# Patient Record
Sex: MALE | Race: WHITE | NOT HISPANIC OR LATINO | Employment: OTHER | URBAN - NONMETROPOLITAN AREA
[De-identification: names, ages, dates, MRNs, and addresses within clinical notes are randomized per-mention and may not be internally consistent; named-entity substitution may affect disease eponyms.]

---

## 2017-04-05 RX ORDER — LISINOPRIL AND HYDROCHLOROTHIAZIDE 20; 12.5 MG/1; MG/1
0.5 TABLET ORAL DAILY
Qty: 90 TABLET | Refills: 2 | Status: SHIPPED | OUTPATIENT
Start: 2017-04-05 | End: 2018-04-17 | Stop reason: SDUPTHER

## 2017-04-05 RX ORDER — ATORVASTATIN CALCIUM 40 MG/1
40 TABLET, FILM COATED ORAL DAILY
Qty: 90 TABLET | Refills: 2 | Status: SHIPPED | OUTPATIENT
Start: 2017-04-05 | End: 2017-04-27 | Stop reason: SDUPTHER

## 2017-04-05 RX ORDER — ISOSORBIDE MONONITRATE 30 MG/1
30 TABLET, EXTENDED RELEASE ORAL DAILY
Qty: 90 TABLET | Refills: 2 | Status: SHIPPED | OUTPATIENT
Start: 2017-04-05 | End: 2017-08-24 | Stop reason: SDUPTHER

## 2017-04-05 RX ORDER — CLOPIDOGREL BISULFATE 75 MG/1
75 TABLET ORAL DAILY
Qty: 90 TABLET | Refills: 2 | Status: SHIPPED | OUTPATIENT
Start: 2017-04-05 | End: 2017-07-26 | Stop reason: SDUPTHER

## 2017-04-11 ENCOUNTER — OFFICE VISIT (OUTPATIENT)
Dept: CARDIOLOGY | Facility: CLINIC | Age: 67
End: 2017-04-11

## 2017-04-11 VITALS
WEIGHT: 207 LBS | DIASTOLIC BLOOD PRESSURE: 84 MMHG | HEIGHT: 69 IN | SYSTOLIC BLOOD PRESSURE: 150 MMHG | HEART RATE: 52 BPM | BODY MASS INDEX: 30.66 KG/M2

## 2017-04-11 DIAGNOSIS — R06.02 SOB (SHORTNESS OF BREATH): ICD-10-CM

## 2017-04-11 DIAGNOSIS — I25.10 ATHEROSCLEROSIS OF NATIVE CORONARY ARTERY OF NATIVE HEART WITHOUT ANGINA PECTORIS: Primary | ICD-10-CM

## 2017-04-11 DIAGNOSIS — R00.1 BRADYCARDIA: ICD-10-CM

## 2017-04-11 PROCEDURE — 99213 OFFICE O/P EST LOW 20 MIN: CPT | Performed by: INTERNAL MEDICINE

## 2017-04-11 RX ORDER — RANOLAZINE 500 MG/1
500 TABLET, EXTENDED RELEASE ORAL 2 TIMES DAILY
COMMUNITY
End: 2017-10-17 | Stop reason: SDUPTHER

## 2017-04-11 NOTE — PROGRESS NOTES
Storm Tellez  66 y.o. male    04/11/2017  1. Atherosclerosis of native coronary artery of native heart without angina pectoris    2. Bradycardia    3. SOB (shortness of breath)        History of Present Illness    Mrs. Tellez is here for follow-up of his above stated problems.  He denied any chest pain, shortness of breath or dizziness and underwent cardiac catheterization in December 2016 which showed that he had patent LIMA to LAD, YOUNG to obtuse marginal and SVG to distal right coronary artery.  Small vessel disease was noted in the diagonal territories.  He was placed on Ranexa which he has not taken a regular basis.  He has been physically quite active without any restrictions.  He will be seeing Dr. Ansari next month and will have lab work done.      SUBJECTIVE    No Known Allergies      Past Medical History:   Diagnosis Date   • Bradycardia    • Cancer     skin   • Coronary atherosclerosis of native coronary artery    • Dizziness    • Hypertension    • Presence of aortocoronary bypass graft    • Sleep apnea    • SOB (shortness of breath)          Past Surgical History:   Procedure Laterality Date   • CARDIAC CATHETERIZATION     • CORONARY ARTERY BYPASS GRAFT     • HERNIA REPAIR     • SHOULDER SURGERY           Family History   Problem Relation Age of Onset   • Heart disease Mother    • Heart disease Father          Social History     Social History   • Marital status:      Spouse name: N/A   • Number of children: N/A   • Years of education: N/A     Occupational History   • Not on file.     Social History Main Topics   • Smoking status: Former Smoker   • Smokeless tobacco: Never Used   • Alcohol use No   • Drug use: No   • Sexual activity: Defer     Other Topics Concern   • Not on file     Social History Narrative         Current Outpatient Prescriptions   Medication Sig Dispense Refill   • aspirin  MG tablet Take 325 mg by mouth Every 6 (Six) Hours As Needed.     • atorvastatin (LIPITOR)  "40 MG tablet Take 1 tablet by mouth Daily. 90 tablet 2   • clopidogrel (PLAVIX) 75 MG tablet Take 1 tablet by mouth Daily. 90 tablet 2   • folic acid (FOLVITE) 1 MG tablet Take 1 mg by mouth Daily.     • isosorbide mononitrate (IMDUR) 30 MG 24 hr tablet Take 1 tablet by mouth Daily. 90 tablet 2   • lisinopril-hydrochlorothiazide (PRINZIDE,ZESTORETIC) 20-12.5 MG per tablet Take 0.5 tablets by mouth Daily. 90 tablet 2   • nitroglycerin (NITROSTAT) 0.4 MG SL tablet Place 0.4 mg under the tongue Every 5 (Five) Minutes As Needed for chest pain. Take no more than 3 doses in 15 minutes.     • ranolazine (RANEXA) 500 MG 12 hr tablet Take 500 mg by mouth 2 (Two) Times a Day.       No current facility-administered medications for this visit.          OBJECTIVE    /84  Pulse 52  Ht 69\" (175.3 cm)  Wt 207 lb (93.9 kg)  BMI 30.57 kg/m2        Review of Systems     Constitutional:  Denies recent weight loss, weight gain, fever or chills, no change in exercise tolerance     HENT:  Denies any hearing loss, epistaxis, hoarseness, or difficulty speaking.     Eyes: Wears eyeglasses or contact lenses     Respiratory:  Denies dyspnea with exertion,no cough, wheezing, or hemoptysis.     Cardiovascular: Negative for palpations, chest pain, orthopnea, PND, peripheral edema, syncope, or claudication.     Gastrointestinal:  Denies change in bowel habits, dyspepsia, ulcer disease, hematochezia, or melena.     Endocrine: Negative for cold intolerance, heat intolerance, polydipsia, polyphagia and polyuria. Denies any history of weight change, heat/cold intolerance, polydipsia, polyuria     Genitourinary: Negative.      Musculoskeletal: Denies any history of arthritic symptoms or back problems     Skin:  Denies any change in hair or nails, rashes, or skin lesions.     Allergic/Immunologic: Negative.  Negative for environmental allergies, food allergies and immunocompromised state.     Neurological:  Denies any history of recurrent " headaches, strokes, TIA, or seizure disorder.     Hematological: Denies any food allergies, seasonal allergies, bleeding disorders, or lymphadenopathy.     Psychiatric/Behavioral: Denies any history of depression, substance abuse, or change in cognitive function.         Physical Exam     Constitutional: Cooperative, alert and oriented, well-developed, well-nourished, in no acute distress.     HENT:   Head: Normocephalic, normal hair patterns, no masses or tenderness.  Ears, Nose, and Throat: No gross abnormalities. No pallor or cyanosis. Dentition good.   Eyes: EOMS intact, PERRL, conjunctivae and lids unremarkable. Fundoscopic exam and visual fields not performed.   Neck: No palpable masses or adenopathy, no thyromegaly, no JVD, carotid pulses are full and equal bilaterally and without  Bruits.     Cardiovascular: Regular rhythm, S1 and S2 normal, no S3 or S4. Apical impulse not displaced. No murmurs, gallops, or rubs detected.     Pulmonary/Chest: Chest: normal symmetry, no tenderness to palpation, normal respiratory excursion, no intercostal retraction, no use of accessory muscles.            Pulmonary: Normal breath sounds. No rales or ronchi.    Abdominal: Abdomen soft, bowel sounds normoactive, no masses, no hepatosplenomegaly, non-tender, no bruits.     Musculoskeletal: No deformities, clubbing, cyanosis, erythema, or edema observed. There are no spinal abnormalities noted. Normal muscle strength and tone. Pulses full and equal in all extremities, no bruits auscultated.     Neurological: No gross motor or sensory deficits noted, affect appropriate, oriented to time, person, place.     Skin: Warm and dry to the touch, no apparent skin lesions or masses noted.     Psychiatric: He has a normal mood and affect. His behavior is normal. Judgment and thought content normal.         Procedures      Lab Results   Component Value Date    WBC 6.1 12/15/2016    HGB 14.8 12/15/2016    HCT 42.8 12/15/2016    MCV 91.3  12/15/2016     12/15/2016     Lab Results   Component Value Date    GLUCOSE 101 (H) 12/15/2016    BUN 18 12/15/2016    CREATININE 0.9 12/15/2016    CO2 26 12/15/2016    CALCIUM 9.0 12/15/2016     No results found for: CHOL  No results found for: TRIG  No results found for: HDL  No results found for: LDLCALC  No results found for: LDL  No results found for: HDLLDLRATIO  No components found for: CHOLHDL  No results found for: HGBA1C  No results found for: TSH, T0UYNML, J4PNHCW, THYROIDAB        ASSESSMENT AND PLAN    Mrs. Tellez is clinically stable with no evidence of angina, or congestive heart failure.  He is tolerating sinus bradycardia quite well with no hemodynamic consequence.  His present antiplatelet therapy with aspirin and Plavix, antianginal therapy with isosorbide mononitrate and Ranexa and antihypertensive therapy with lisinopril HCTZ and statin therapy with Lipitor have been continued.  Diagnoses and all orders for this visit:    Atherosclerosis of native coronary artery of native heart without angina pectoris    Bradycardia    SOB (shortness of breath)        Pradeep Francis MD  4/11/2017  11:55 AM

## 2017-04-27 RX ORDER — ATORVASTATIN CALCIUM 40 MG/1
40 TABLET, FILM COATED ORAL DAILY
Qty: 90 TABLET | Refills: 4 | Status: SHIPPED | OUTPATIENT
Start: 2017-04-27 | End: 2018-04-17 | Stop reason: SDUPTHER

## 2017-07-26 RX ORDER — CLOPIDOGREL BISULFATE 75 MG/1
75 TABLET ORAL DAILY
Qty: 90 TABLET | Refills: 2 | Status: SHIPPED | OUTPATIENT
Start: 2017-07-26 | End: 2018-04-17 | Stop reason: SDUPTHER

## 2017-08-22 ENCOUNTER — APPOINTMENT (OUTPATIENT)
Dept: GENERAL RADIOLOGY | Facility: HOSPITAL | Age: 67
End: 2017-08-22

## 2017-08-22 ENCOUNTER — APPOINTMENT (OUTPATIENT)
Dept: CT IMAGING | Facility: HOSPITAL | Age: 67
End: 2017-08-22

## 2017-08-22 ENCOUNTER — HOSPITAL ENCOUNTER (EMERGENCY)
Facility: HOSPITAL | Age: 67
Discharge: HOME | End: 2017-08-22
Attending: FAMILY MEDICINE | Admitting: FAMILY MEDICINE

## 2017-08-22 VITALS
BODY MASS INDEX: 30.36 KG/M2 | DIASTOLIC BLOOD PRESSURE: 77 MMHG | RESPIRATION RATE: 18 BRPM | OXYGEN SATURATION: 96 % | WEIGHT: 205 LBS | HEART RATE: 58 BPM | TEMPERATURE: 98.3 F | SYSTOLIC BLOOD PRESSURE: 160 MMHG | HEIGHT: 69 IN

## 2017-08-22 DIAGNOSIS — S22.31XA CLOSED FRACTURE OF ONE RIB OF RIGHT SIDE, INITIAL ENCOUNTER: Primary | ICD-10-CM

## 2017-08-22 LAB
ALBUMIN SERPL-MCNC: 4.3 G/DL (ref 3.4–4.8)
ALBUMIN/GLOB SERPL: 1.7 G/DL (ref 1.1–1.8)
ALP SERPL-CCNC: 50 U/L (ref 38–126)
ALT SERPL W P-5'-P-CCNC: 24 U/L (ref 21–72)
ANION GAP SERPL CALCULATED.3IONS-SCNC: 11 MMOL/L (ref 5–15)
AST SERPL-CCNC: 25 U/L (ref 17–59)
BASOPHILS # BLD AUTO: 0.05 10*3/MM3 (ref 0–0.2)
BASOPHILS NFR BLD AUTO: 0.7 % (ref 0–2)
BILIRUB SERPL-MCNC: 1 MG/DL (ref 0.2–1.3)
BUN BLD-MCNC: 17 MG/DL (ref 7–21)
BUN/CREAT SERPL: 20 (ref 7–25)
CALCIUM SPEC-SCNC: 9.1 MG/DL (ref 8.4–10.2)
CHLORIDE SERPL-SCNC: 106 MMOL/L (ref 95–110)
CO2 SERPL-SCNC: 21 MMOL/L (ref 22–31)
CREAT BLD-MCNC: 0.85 MG/DL (ref 0.7–1.3)
DEPRECATED RDW RBC AUTO: 45.8 FL (ref 35.1–43.9)
EOSINOPHIL # BLD AUTO: 0.28 10*3/MM3 (ref 0–0.7)
EOSINOPHIL NFR BLD AUTO: 4.1 % (ref 0–7)
ERYTHROCYTE [DISTWIDTH] IN BLOOD BY AUTOMATED COUNT: 13.5 % (ref 11.5–14.5)
GFR SERPL CREATININE-BSD FRML MDRD: 90 ML/MIN/1.73 (ref 49–113)
GLOBULIN UR ELPH-MCNC: 2.6 GM/DL (ref 2.3–3.5)
GLUCOSE BLD-MCNC: 137 MG/DL (ref 60–100)
HCT VFR BLD AUTO: 43 % (ref 39–49)
HGB BLD-MCNC: 14.9 G/DL (ref 13.7–17.3)
HOLD SPECIMEN: NORMAL
IMM GRANULOCYTES # BLD: 0.01 10*3/MM3 (ref 0–0.02)
IMM GRANULOCYTES NFR BLD: 0.1 % (ref 0–0.5)
INR PPP: 0.96 (ref 0.8–1.2)
LYMPHOCYTES # BLD AUTO: 1.29 10*3/MM3 (ref 0.6–4.2)
LYMPHOCYTES NFR BLD AUTO: 19 % (ref 10–50)
MCH RBC QN AUTO: 32.2 PG (ref 26.5–34)
MCHC RBC AUTO-ENTMCNC: 34.7 G/DL (ref 31.5–36.3)
MCV RBC AUTO: 92.9 FL (ref 80–98)
MONOCYTES # BLD AUTO: 0.62 10*3/MM3 (ref 0–0.9)
MONOCYTES NFR BLD AUTO: 9.1 % (ref 0–12)
NEUTROPHILS # BLD AUTO: 4.54 10*3/MM3 (ref 2–8.6)
NEUTROPHILS NFR BLD AUTO: 67 % (ref 37–80)
PLATELET # BLD AUTO: 192 10*3/MM3 (ref 150–450)
PMV BLD AUTO: 10.7 FL (ref 8–12)
POTASSIUM BLD-SCNC: 3.8 MMOL/L (ref 3.5–5.1)
PROT SERPL-MCNC: 6.9 G/DL (ref 6.3–8.6)
PROTHROMBIN TIME: 12.7 SECONDS (ref 11.1–15.3)
RBC # BLD AUTO: 4.63 10*6/MM3 (ref 4.37–5.74)
SODIUM BLD-SCNC: 138 MMOL/L (ref 137–145)
WBC NRBC COR # BLD: 6.79 10*3/MM3 (ref 3.2–9.8)

## 2017-08-22 PROCEDURE — 85025 COMPLETE CBC W/AUTO DIFF WBC: CPT | Performed by: PHYSICIAN ASSISTANT

## 2017-08-22 PROCEDURE — 71020 HC CHEST PA AND LATERAL: CPT

## 2017-08-22 PROCEDURE — 71100 X-RAY EXAM RIBS UNI 2 VIEWS: CPT

## 2017-08-22 PROCEDURE — 85610 PROTHROMBIN TIME: CPT | Performed by: PHYSICIAN ASSISTANT

## 2017-08-22 PROCEDURE — 99283 EMERGENCY DEPT VISIT LOW MDM: CPT

## 2017-08-22 PROCEDURE — 80053 COMPREHEN METABOLIC PANEL: CPT | Performed by: PHYSICIAN ASSISTANT

## 2017-08-22 PROCEDURE — 70450 CT HEAD/BRAIN W/O DYE: CPT

## 2017-08-22 RX ORDER — LANOLIN ALCOHOL/MO/W.PET/CERES
400 CREAM (GRAM) TOPICAL DAILY
COMMUNITY

## 2017-08-22 RX ORDER — HYDROCODONE BITARTRATE AND ACETAMINOPHEN 7.5; 325 MG/1; MG/1
1 TABLET ORAL EVERY 6 HOURS PRN
Qty: 12 TABLET | Refills: 0 | Status: SHIPPED | OUTPATIENT
Start: 2017-08-22 | End: 2017-08-25

## 2017-08-22 NOTE — DISCHARGE INSTRUCTIONS

## 2017-08-24 RX ORDER — ISOSORBIDE MONONITRATE 30 MG/1
30 TABLET, EXTENDED RELEASE ORAL DAILY
Qty: 90 TABLET | Refills: 3 | Status: SHIPPED | OUTPATIENT
Start: 2017-08-24 | End: 2018-04-17 | Stop reason: SDUPTHER

## 2017-08-28 NOTE — ED PROVIDER NOTES
Subjective   HPI Comments: Patient states he was out in the yard yesterday, he was walking down a hill, slipped, and fell.  Reports falling forward and is c/o right sided chest pain, from landing. Has small  abrasions to right forehead and right knee. Denies passing out          History provided by:  Patient   used: No        Review of Systems   Constitutional: Negative.    HENT: Negative.    Eyes: Negative.    Respiratory: Negative.    Cardiovascular: Positive for chest pain. Negative for palpitations and leg swelling.   Gastrointestinal: Negative.    Endocrine: Negative.    Genitourinary: Negative.    Musculoskeletal: Negative.    Skin: Negative.    Allergic/Immunologic: Negative.    Neurological: Negative.    Hematological: Negative.    Psychiatric/Behavioral: Negative.        Past Medical History:   Diagnosis Date   • Bradycardia    • Cancer     skin   • Coronary atherosclerosis of native coronary artery    • Dizziness    • Hypertension    • Presence of aortocoronary bypass graft    • Sleep apnea    • SOB (shortness of breath)        No Known Allergies    Past Surgical History:   Procedure Laterality Date   • CARDIAC CATHETERIZATION     • CORONARY ARTERY BYPASS GRAFT     • HERNIA REPAIR     • SHOULDER SURGERY         Family History   Problem Relation Age of Onset   • Heart disease Mother    • Heart disease Father        Social History     Social History   • Marital status:      Spouse name: N/A   • Number of children: N/A   • Years of education: N/A     Social History Main Topics   • Smoking status: Former Smoker   • Smokeless tobacco: Never Used   • Alcohol use No   • Drug use: No   • Sexual activity: Defer     Other Topics Concern   • None     Social History Narrative           Objective   Physical Exam   Constitutional: He is oriented to person, place, and time. He appears well-developed and well-nourished. No distress.   HENT:   Head: Normocephalic and atraumatic.   Mouth/Throat:  No oropharyngeal exudate.   Abrasion to right side of forehead.    Eyes: EOM are normal. Pupils are equal, round, and reactive to light. Right eye exhibits no discharge. Left eye exhibits no discharge. No scleral icterus.   Neck: Normal range of motion. Neck supple. No JVD present. No tracheal deviation present. No thyromegaly present.   Cardiovascular: Normal rate, regular rhythm, normal heart sounds and intact distal pulses.  Exam reveals no gallop and no friction rub.    No murmur heard.  Pulmonary/Chest: Effort normal and breath sounds normal. No stridor. No respiratory distress. He has no wheezes. He has no rales. He exhibits no tenderness.   Abdominal: Soft. Bowel sounds are normal. He exhibits no distension and no mass. There is no tenderness. There is no rebound and no guarding. No hernia.   Musculoskeletal: Normal range of motion. He exhibits tenderness. He exhibits no edema or deformity.   Tenderness to right lateral chest wall   Lymphadenopathy:     He has no cervical adenopathy.   Neurological: He is alert and oriented to person, place, and time. He has normal reflexes. He displays normal reflexes. No cranial nerve deficit. He exhibits normal muscle tone. Coordination normal.   Skin: Skin is warm and dry. No rash noted. He is not diaphoretic. No erythema. No pallor.   Psychiatric: He has a normal mood and affect. His behavior is normal. Judgment and thought content normal.   Vitals reviewed.      Procedures         ED Course  ED Course          Xr Chest 2 View    Result Date: 8/22/2017  Narrative: [facility] [place] Patient Name: KRISTINA BARBA ORDERING: JUAN LUIS CORMIER ATTENDING: HUDSON PULIDO REFERRING: JUAN LUIS CORMIER ----------------------- PROCEDURE: Two-view chest COMPARISON: None. HISTORY: Injury, pain FINDINGS: Frontal and lateral views of the chest are obtained. Devices: None Lungs/Pleura: The lungs are clear Cardiomediastinal structures: Normal. Sternal suture wires appear aligned  and intact. There are postsurgical changes in the epigastric region and mediastinum.     Impression: CONCLUSION:  No acute cardiopulmonary disease Electronically signed by:  Ian Pfeiffer MD  8/22/2017 10:12 AM CDT Workstation: [id number]    Xr Ribs 2 View Right    Result Date: 8/22/2017  Narrative: Procedure: XR RIBS 2 VIEWS UNILATERAL. History: injury. Comparison: None Findings: There is a nondisplaced fracture of the anterior right fifth rib.     Impression: Impression: Nondisplaced fracture of the anterior right fifth rib. Electronically signed by:  Ian Pfeiffer MD  8/22/2017 10:15 AM CDT Workstation: [id number]    Ct Head Without Contrast    Result Date: 8/22/2017  Narrative: Noncontrast CT examination of the brain. INDICATION: Head trauma, injury Technique: Axial 5 mm contiguous images with brain parenchymal and bone windows This exam was performed according to our departmental dose-optimization program, which includes automated exposure control, adjustment of the mA and/or kV according to patient size and/or use of iterative reconstruction technique. Prior relevant examination: None. Brain parenchyma appears within normal limits. Ventricles are within normal limits in size. No evidence of abnormal mass or calcification is seen. No evidence of acute hemorrhage is noted. Bony structures appear within normal limits and the mastoid air cells and visualized paranasal sinuses are normally aerated.     Impression: CONCLUSION: 1. Normal brain for age. No acute traumatic changes. Electronically signed by:  Marcello Boston MD  8/22/2017 10:49 AM CDT Workstation: [id number]            [facility]  Number of Diagnoses or Management Options  Closed fracture of one rib of right side, initial encounter:       Final diagnoses:   Closed fracture of one rib of right side, initial encounter            NIKIA Conway  08/28/17 1112

## 2017-10-17 ENCOUNTER — FLU SHOT (OUTPATIENT)
Dept: FAMILY MEDICINE CLINIC | Facility: CLINIC | Age: 67
End: 2017-10-17

## 2017-10-17 ENCOUNTER — OFFICE VISIT (OUTPATIENT)
Dept: CARDIOLOGY | Facility: CLINIC | Age: 67
End: 2017-10-17

## 2017-10-17 VITALS
HEIGHT: 69 IN | HEART RATE: 56 BPM | SYSTOLIC BLOOD PRESSURE: 156 MMHG | BODY MASS INDEX: 30.96 KG/M2 | WEIGHT: 209 LBS | DIASTOLIC BLOOD PRESSURE: 84 MMHG

## 2017-10-17 DIAGNOSIS — I10 ESSENTIAL HYPERTENSION: ICD-10-CM

## 2017-10-17 DIAGNOSIS — Z95.1 PRESENCE OF AORTOCORONARY BYPASS GRAFT: ICD-10-CM

## 2017-10-17 DIAGNOSIS — I25.10 ATHEROSCLEROSIS OF NATIVE CORONARY ARTERY OF NATIVE HEART WITHOUT ANGINA PECTORIS: Primary | ICD-10-CM

## 2017-10-17 PROCEDURE — G0008 ADMIN INFLUENZA VIRUS VAC: HCPCS | Performed by: FAMILY MEDICINE

## 2017-10-17 PROCEDURE — 90662 IIV NO PRSV INCREASED AG IM: CPT | Performed by: FAMILY MEDICINE

## 2017-10-17 PROCEDURE — 99213 OFFICE O/P EST LOW 20 MIN: CPT | Performed by: INTERNAL MEDICINE

## 2017-10-17 RX ORDER — RANOLAZINE 500 MG/1
500 TABLET, EXTENDED RELEASE ORAL 2 TIMES DAILY
Qty: 180 TABLET | Refills: 4 | Status: SHIPPED | OUTPATIENT
Start: 2017-10-17 | End: 2018-04-17 | Stop reason: SDUPTHER

## 2017-10-17 NOTE — PROGRESS NOTES
Storm Tellez  67 y.o. male    10/17/2017  1. Atherosclerosis of native coronary artery of native heart without angina pectoris    2. Presence of aortocoronary bypass graft    3. Essential hypertension        History of Present Illness    Mr. Tellez is here for follow-up of his above stated problems.  He has done quite well since I last saw him with no chest pain, shortness of breath, palpitation, dizziness or syncope.  His been compliant with his medications including Ranexa.  His lipid profiles within the normal range in November last year and I understand that this will be checked again later this year.  Clinical exam was unremarkable.        SUBJECTIVE    No Known Allergies      Past Medical History:   Diagnosis Date   • Bradycardia    • Cancer     skin   • Coronary atherosclerosis of native coronary artery    • Dizziness    • Hypertension    • Presence of aortocoronary bypass graft    • Sleep apnea    • SOB (shortness of breath)          Past Surgical History:   Procedure Laterality Date   • CARDIAC CATHETERIZATION     • CORONARY ARTERY BYPASS GRAFT     • HERNIA REPAIR     • SHOULDER SURGERY           Family History   Problem Relation Age of Onset   • Heart disease Mother    • Heart disease Father          Social History     Social History   • Marital status:      Spouse name: N/A   • Number of children: N/A   • Years of education: N/A     Occupational History   • Not on file.     Social History Main Topics   • Smoking status: Former Smoker   • Smokeless tobacco: Never Used   • Alcohol use No   • Drug use: No   • Sexual activity: Defer     Other Topics Concern   • Not on file     Social History Narrative         Current Outpatient Prescriptions   Medication Sig Dispense Refill   • aspirin  MG tablet Take 325 mg by mouth Every 6 (Six) Hours As Needed.     • atorvastatin (LIPITOR) 40 MG tablet Take 1 tablet by mouth Daily. 90 tablet 4   • clopidogrel (PLAVIX) 75 MG tablet Take 1 tablet by mouth  "Daily. 90 tablet 2   • folic acid (FOLVITE) 400 MCG tablet Take 400 mcg by mouth Daily.     • isosorbide mononitrate (IMDUR) 30 MG 24 hr tablet Take 1 tablet by mouth Daily. 90 tablet 3   • lisinopril-hydrochlorothiazide (PRINZIDE,ZESTORETIC) 20-12.5 MG per tablet Take 0.5 tablets by mouth Daily. 90 tablet 2   • nitroglycerin (NITROSTAT) 0.4 MG SL tablet Place 0.4 mg under the tongue Every 5 (Five) Minutes As Needed for chest pain. Take no more than 3 doses in 15 minutes.     • ranolazine (RANEXA) 500 MG 12 hr tablet Take 1 tablet by mouth 2 (Two) Times a Day. 180 tablet 4     No current facility-administered medications for this visit.          OBJECTIVE    /84  Pulse 56  Ht 69\" (175.3 cm)  Wt 209 lb (94.8 kg)  BMI 30.86 kg/m2        Review of Systems     Constitutional:  Denies recent weight loss, weight gain, fever or chills, no change in exercise tolerance     HENT:  Denies any hearing loss, epistaxis, hoarseness, or difficulty speaking.     Eyes: Wears eyeglasses or contact lenses     Respiratory:  Denies dyspnea with exertion,no cough, wheezing, or hemoptysis.     Cardiovascular: Negative for palpations, chest pain, orthopnea, PND, peripheral edema, syncope, or claudication.     Gastrointestinal:  Denies change in bowel habits, dyspepsia, ulcer disease, hematochezia, or melena.     Endocrine: Negative for cold intolerance, heat intolerance, polydipsia, polyphagia and polyuria. Denies any history of weight change, heat/cold intolerance, polydipsia, polyuria     Genitourinary: Negative.      Musculoskeletal: Denies any history of arthritic symptoms or back problems     Skin:  Denies any change in hair or nails, rashes, or skin lesions.     Allergic/Immunologic: Negative.  Negative for environmental allergies, food allergies and immunocompromised state.     Neurological:  Denies any history of recurrent headaches, strokes, TIA, or seizure disorder.     Hematological: Denies any food allergies, seasonal " allergies, bleeding disorders, or lymphadenopathy.     Psychiatric/Behavioral: Denies any history of depression, substance abuse, or change in cognitive function.         Physical Exam     Constitutional: Cooperative, alert and oriented, well-developed, well-nourished, in no acute distress.     HENT:   Head: Normocephalic, normal hair patterns, no masses or tenderness.  Ears, Nose, and Throat: No gross abnormalities. No pallor or cyanosis. Dentition good.   Eyes: EOMS intact, PERRL, conjunctivae and lids unremarkable. Fundoscopic exam and visual fields not performed.   Neck: No palpable masses or adenopathy, no thyromegaly, no JVD, carotid pulses are full and equal bilaterally and without  Bruits.     Cardiovascular: Regular rhythm, S1 and S2 normal, no S3 or S4. Apical impulse not displaced. No murmurs, gallops, or rubs detected.     Pulmonary/Chest: Chest: normal symmetry, no tenderness to palpation, normal respiratory excursion, no intercostal retraction, no use of accessory muscles.            Pulmonary: Normal breath sounds. No rales or ronchi.    Abdominal: Abdomen soft, bowel sounds normoactive, no masses, no hepatosplenomegaly, non-tender, no bruits.     Musculoskeletal: No deformities, clubbing, cyanosis, erythema, or edema observed. There are no spinal abnormalities noted. Normal muscle strength and tone. Pulses full and equal in all extremities, no bruits auscultated.     Neurological: No gross motor or sensory deficits noted, affect appropriate, oriented to time, person, place.     Skin: Warm and dry to the touch, no apparent skin lesions or masses noted.     Psychiatric: He has a normal mood and affect. His behavior is normal. Judgment and thought content normal.         Procedures      Lab Results   Component Value Date    WBC 6.79 08/22/2017    HGB 14.9 08/22/2017    HCT 43.0 08/22/2017    MCV 92.9 08/22/2017     08/22/2017     Lab Results   Component Value Date    GLUCOSE 137 (H) 08/22/2017     BUN 17 08/22/2017    CREATININE 0.85 08/22/2017    EGFRIFNONA 90 08/22/2017    BCR 20.0 08/22/2017    CO2 21.0 (L) 08/22/2017    CALCIUM 9.1 08/22/2017    ALBUMIN 4.30 08/22/2017    LABIL2 1.7 08/22/2017    AST 25 08/22/2017    ALT 24 08/22/2017     No results found for: CHOL  No results found for: TRIG  No results found for: HDL  No results found for: LDLCALC  No results found for: LDL  No results found for: HDLLDLRATIO  No components found for: CHOLHDL  No results found for: HGBA1C  No results found for: TSH, J6LGGNO, D3IPDSQ, THYROIDAB        ASSESSMENT AND PLAN  Mr. Tellez is doing well with no evidence of angina, arrhythmia or congestive heart failure.  Antiplatelet therapy with aspirin and Plavix, lipid-lowering therapy with atorvastatin, antianginal therapy with isosorbide mononitrate and Ranexa and antihypertensive therapy with lisinopril HCTZ have been continued.    Storm was seen today for follow-up.    Diagnoses and all orders for this visit:    Atherosclerosis of native coronary artery of native heart without angina pectoris    Presence of aortocoronary bypass graft    Essential hypertension        Pradeep Francis MD  10/17/2017  8:11 AM

## 2018-04-16 ENCOUNTER — TRANSCRIBE ORDERS (OUTPATIENT)
Dept: CT IMAGING | Facility: HOSPITAL | Age: 68
End: 2018-04-16

## 2018-04-16 DIAGNOSIS — R93.89 ABNORMAL X-RAY: Primary | ICD-10-CM

## 2018-04-17 ENCOUNTER — OFFICE VISIT (OUTPATIENT)
Dept: CARDIOLOGY | Facility: CLINIC | Age: 68
End: 2018-04-17

## 2018-04-17 VITALS
WEIGHT: 202.56 LBS | HEIGHT: 69 IN | DIASTOLIC BLOOD PRESSURE: 76 MMHG | BODY MASS INDEX: 30 KG/M2 | HEART RATE: 90 BPM | SYSTOLIC BLOOD PRESSURE: 128 MMHG | OXYGEN SATURATION: 96 %

## 2018-04-17 DIAGNOSIS — I25.10 ATHEROSCLEROSIS OF NATIVE CORONARY ARTERY OF NATIVE HEART WITHOUT ANGINA PECTORIS: Primary | ICD-10-CM

## 2018-04-17 DIAGNOSIS — I10 ESSENTIAL HYPERTENSION: ICD-10-CM

## 2018-04-17 PROCEDURE — 99214 OFFICE O/P EST MOD 30 MIN: CPT | Performed by: INTERNAL MEDICINE

## 2018-04-17 RX ORDER — RANOLAZINE 500 MG/1
500 TABLET, EXTENDED RELEASE ORAL EVERY 12 HOURS SCHEDULED
Qty: 180 TABLET | Refills: 3 | Status: SHIPPED | OUTPATIENT
Start: 2018-04-17 | End: 2019-04-07 | Stop reason: SDUPTHER

## 2018-04-17 RX ORDER — NITROGLYCERIN 0.4 MG/1
0.4 TABLET SUBLINGUAL
Qty: 30 TABLET | Refills: 6 | Status: SHIPPED | OUTPATIENT
Start: 2018-04-17

## 2018-04-17 RX ORDER — ATORVASTATIN CALCIUM 40 MG/1
40 TABLET, FILM COATED ORAL DAILY
Qty: 90 TABLET | Refills: 3 | Status: SHIPPED | OUTPATIENT
Start: 2018-04-17 | End: 2018-04-26 | Stop reason: SDUPTHER

## 2018-04-17 RX ORDER — ISOSORBIDE MONONITRATE 30 MG/1
30 TABLET, EXTENDED RELEASE ORAL DAILY
Qty: 90 TABLET | Refills: 3 | Status: SHIPPED | OUTPATIENT
Start: 2018-04-17 | End: 2018-08-16 | Stop reason: SDUPTHER

## 2018-04-17 RX ORDER — LISINOPRIL AND HYDROCHLOROTHIAZIDE 20; 12.5 MG/1; MG/1
0.5 TABLET ORAL DAILY
Qty: 90 TABLET | Refills: 2 | Status: SHIPPED | OUTPATIENT
Start: 2018-04-17 | End: 2019-07-11 | Stop reason: SDUPTHER

## 2018-04-17 RX ORDER — CLOPIDOGREL BISULFATE 75 MG/1
75 TABLET ORAL DAILY
Qty: 90 TABLET | Refills: 3 | Status: SHIPPED | OUTPATIENT
Start: 2018-04-17 | End: 2018-04-26 | Stop reason: SDUPTHER

## 2018-04-17 RX ORDER — ALBUTEROL SULFATE 90 UG/1
2 AEROSOL, METERED RESPIRATORY (INHALATION) EVERY 4 HOURS PRN
COMMUNITY
Start: 2018-04-12 | End: 2021-07-16

## 2018-04-17 RX ORDER — AMOXICILLIN AND CLAVULANATE POTASSIUM 875; 125 MG/1; MG/1
TABLET, FILM COATED ORAL
COMMUNITY
Start: 2018-04-12 | End: 2020-06-16

## 2018-04-17 NOTE — PROGRESS NOTES
Storm Tellez  67 y.o. male    04/17/2018  1. Atherosclerosis of native coronary artery of native heart without angina pectoris    2. Essential hypertension        History of Present Illness  Mr. Tellez is here for follow-up of his above stated problems.  He denied any chest pain, shortness of breath, palpitation, dizziness or syncope.  He has had upper respiratory infection for which he is being treated by Dr. Ansari.  He is on Ventolin inhaler.  No signs of congestive heart failure was noted.  His blood pressure was in the normal range.  His lipid profiles were checked recently and they were in the normal range.  LDL was 58.          SUBJECTIVE    No Known Allergies      Past Medical History:   Diagnosis Date   • Bradycardia    • Cancer     skin   • Coronary atherosclerosis of native coronary artery    • Dizziness    • Hypertension    • Presence of aortocoronary bypass graft    • Sleep apnea    • SOB (shortness of breath)          Past Surgical History:   Procedure Laterality Date   • CARDIAC CATHETERIZATION     • CORONARY ARTERY BYPASS GRAFT     • HERNIA REPAIR     • SHOULDER SURGERY           Family History   Problem Relation Age of Onset   • Heart disease Mother    • Heart disease Father          Social History     Social History   • Marital status:      Spouse name: N/A   • Number of children: N/A   • Years of education: N/A     Occupational History   • Not on file.     Social History Main Topics   • Smoking status: Former Smoker   • Smokeless tobacco: Never Used   • Alcohol use No   • Drug use: No   • Sexual activity: Defer     Other Topics Concern   • Not on file     Social History Narrative   • No narrative on file         Current Outpatient Prescriptions   Medication Sig Dispense Refill   • amoxicillin-clavulanate (AUGMENTIN) 875-125 MG per tablet      • aspirin  MG tablet Take 325 mg by mouth Every 6 (Six) Hours As Needed.     • atorvastatin (LIPITOR) 40 MG tablet Take 1 tablet by  "mouth Daily. 90 tablet 3   • clopidogrel (PLAVIX) 75 MG tablet Take 1 tablet by mouth Daily. 90 tablet 3   • folic acid (FOLVITE) 400 MCG tablet Take 400 mcg by mouth Daily.     • isosorbide mononitrate (IMDUR) 30 MG 24 hr tablet Take 1 tablet by mouth Daily. 90 tablet 3   • lisinopril-hydrochlorothiazide (PRINZIDE,ZESTORETIC) 20-12.5 MG per tablet Take 0.5 tablets by mouth Daily. 90 tablet 2   • nitroglycerin (NITROSTAT) 0.4 MG SL tablet Place 1 tablet under the tongue Every 5 (Five) Minutes As Needed for Chest Pain. Take no more than 3 doses in 15 minutes. 30 tablet 6   • ranolazine (RANEXA) 500 MG 12 hr tablet Take 1 tablet by mouth Every 12 (Twelve) Hours. 180 tablet 3   • VENTOLIN  (90 Base) MCG/ACT inhaler Inhale 2 puffs Every 4 (Four) Hours As Needed.       No current facility-administered medications for this visit.          OBJECTIVE    /76 (BP Location: Left arm, Patient Position: Sitting, Cuff Size: Adult)   Pulse 90   Ht 175.3 cm (69\")   Wt 91.9 kg (202 lb 9 oz)   SpO2 96%   BMI 29.91 kg/m²         Review of Systems     Constitutional:  Denies recent weight loss, weight gain, fever or chills, no change in exercise tolerance     HENT:  Denies any hearing loss, epistaxis, hoarseness, or difficulty speaking.     Eyes: Wears eyeglasses or contact lenses     Respiratory:  Denies dyspnea with exertion,no cough. Has upper respiratory congestion    Cardiovascular: Negative for palpations, chest pain, orthopnea, PND, peripheral edema, syncope, or claudication.     Gastrointestinal:  Denies change in bowel habits, dyspepsia, ulcer disease, hematochezia, or melena.     Endocrine: Negative for cold intolerance, heat intolerance, polydipsia, polyphagia and polyuria. Denies any history of weight change, heat/cold intolerance, polydipsia, polyuria     Genitourinary: Negative.      Musculoskeletal: Denies any history of arthritic symptoms or back problems     Skin:  Denies any change in hair or nails, " rashes, or skin lesions.     Allergic/Immunologic: Negative.  Negative for environmental allergies, food allergies and immunocompromised state.     Neurological:  Denies any history of recurrent headaches, strokes, TIA, or seizure disorder.     Hematological: Denies any food allergies, seasonal allergies, bleeding disorders, or lymphadenopathy.     Psychiatric/Behavioral: Denies any history of depression, substance abuse, or change in cognitive function.         Physical Exam     Constitutional: Cooperative, alert and oriented, well-developed, well-nourished, in no acute distress.     HENT:   Head: Normocephalic, normal hair patterns, no masses or tenderness.  Ears, Nose, and Throat: No gross abnormalities. No pallor or cyanosis.   Eyes: EOMS intact, PERRL, conjunctivae and lids unremarkable. Fundoscopic exam and visual fields not performed.   Neck: No palpable masses or adenopathy, no thyromegaly, no JVD, carotid pulses are full and equal bilaterally and without  Bruits.     Cardiovascular: Regular rhythm, S1 and S2 normal, no S3 or S4. Apical impulse not displaced. No murmurs, gallops, or rubs detected.     Pulmonary/Chest: Chest: normal symmetry, no tenderness to palpation, normal respiratory excursion, no intercostal retraction, no use of accessory muscles.            Pulmonary: Normal breath sounds. No rales or ronchi.    Abdominal: Abdomen soft, bowel sounds normoactive, no masses, no hepatosplenomegaly, non-tender, no bruits.     Musculoskeletal: No deformities, clubbing, cyanosis, erythema, or edema observed.     Neurological: No gross motor or sensory deficits noted, affect appropriate, oriented to time, person, place.     Skin: Warm and dry to the touch, no apparent skin lesions or masses noted.     Psychiatric: He has a normal mood and affect. His behavior is normal. Judgment and thought content normal.         Procedures      Lab Results   Component Value Date    WBC 6.79 08/22/2017    HGB 14.9  08/22/2017    HCT 43.0 08/22/2017    MCV 92.9 08/22/2017     08/22/2017     Lab Results   Component Value Date    GLUCOSE 137 (H) 08/22/2017    BUN 17 08/22/2017    CREATININE 0.85 08/22/2017    EGFRIFNONA 90 08/22/2017    BCR 20.0 08/22/2017    CO2 21.0 (L) 08/22/2017    CALCIUM 9.1 08/22/2017    ALBUMIN 4.30 08/22/2017    LABIL2 1.7 08/22/2017    AST 25 08/22/2017    ALT 24 08/22/2017     No results found for: CHOL  No results found for: TRIG  No results found for: HDL  No components found for: LDLCALC  No results found for: LDL  No results found for: HDLLDLRATIO  No components found for: CHOLHDL  No results found for: HGBA1C  No results found for: TSH, X2OKEXX, R0PZGNV, THYROIDAB        ASSESSMENT AND PLAN  Mr. Tellez is stable with regards to his heart with no evidence of progression of CAD.  No signs of congestive heart failure noted.  His present antiplatelet therapy with aspirin and Plavix, antianginal therapy with isosorbide mononitrate and Ranexa and antihypertensive therapy with lisinopril HCTZ have been continued.  Prescriptions were refilled.    Storm was seen today for hypertension.    Diagnoses and all orders for this visit:    Atherosclerosis of native coronary artery of native heart without angina pectoris    Essential hypertension    Other orders  -     atorvastatin (LIPITOR) 40 MG tablet; Take 1 tablet by mouth Daily.  -     clopidogrel (PLAVIX) 75 MG tablet; Take 1 tablet by mouth Daily.  -     isosorbide mononitrate (IMDUR) 30 MG 24 hr tablet; Take 1 tablet by mouth Daily.  -     lisinopril-hydrochlorothiazide (PRINZIDE,ZESTORETIC) 20-12.5 MG per tablet; Take 0.5 tablets by mouth Daily.  -     ranolazine (RANEXA) 500 MG 12 hr tablet; Take 1 tablet by mouth Every 12 (Twelve) Hours.  -     nitroglycerin (NITROSTAT) 0.4 MG SL tablet; Place 1 tablet under the tongue Every 5 (Five) Minutes As Needed for Chest Pain. Take no more than 3 doses in 15 minutes.        Pradeep Francis,  MD  4/17/2018  9:00 AM

## 2018-04-18 ENCOUNTER — HOSPITAL ENCOUNTER (OUTPATIENT)
Dept: CT IMAGING | Facility: HOSPITAL | Age: 68
Discharge: HOME | End: 2018-04-18
Attending: FAMILY MEDICINE | Admitting: FAMILY MEDICINE

## 2018-04-18 DIAGNOSIS — R93.89 ABNORMAL X-RAY: ICD-10-CM

## 2018-04-18 PROCEDURE — 71260 CT THORAX DX C+: CPT

## 2018-04-18 PROCEDURE — 25010000002 IOPAMIDOL 61 % SOLUTION: Performed by: FAMILY MEDICINE

## 2018-04-18 RX ADMIN — IOPAMIDOL 80 ML: 612 INJECTION, SOLUTION INTRAVENOUS at 16:17

## 2018-04-26 RX ORDER — ATORVASTATIN CALCIUM 40 MG/1
40 TABLET, FILM COATED ORAL DAILY
Qty: 90 TABLET | Refills: 3 | Status: SHIPPED | OUTPATIENT
Start: 2018-04-26 | End: 2018-04-30 | Stop reason: SDUPTHER

## 2018-04-26 RX ORDER — CLOPIDOGREL BISULFATE 75 MG/1
75 TABLET ORAL DAILY
Qty: 90 TABLET | Refills: 3 | Status: SHIPPED | OUTPATIENT
Start: 2018-04-26 | End: 2018-04-30 | Stop reason: SDUPTHER

## 2018-04-26 NOTE — TELEPHONE ENCOUNTER
Refill Request came in by fax from [facility]    Refill sent to pharmacy via e-scribe.

## 2018-04-30 RX ORDER — ATORVASTATIN CALCIUM 40 MG/1
40 TABLET, FILM COATED ORAL DAILY
Qty: 90 TABLET | Refills: 3 | Status: SHIPPED | OUTPATIENT
Start: 2018-04-30 | End: 2020-06-16

## 2018-04-30 RX ORDER — CLOPIDOGREL BISULFATE 75 MG/1
75 TABLET ORAL DAILY
Qty: 90 TABLET | Refills: 3 | Status: SHIPPED | OUTPATIENT
Start: 2018-04-30 | End: 2020-07-06

## 2018-08-16 RX ORDER — ISOSORBIDE MONONITRATE 30 MG/1
30 TABLET, EXTENDED RELEASE ORAL DAILY
Qty: 90 TABLET | Refills: 3 | Status: SHIPPED | OUTPATIENT
Start: 2018-08-16 | End: 2019-08-10 | Stop reason: SDUPTHER

## 2018-10-05 ENCOUNTER — FLU SHOT (OUTPATIENT)
Dept: FAMILY MEDICINE CLINIC | Facility: CLINIC | Age: 68
End: 2018-10-05

## 2018-10-05 DIAGNOSIS — Z23 FLU VACCINE NEED: Primary | ICD-10-CM

## 2018-10-05 PROCEDURE — G0008 ADMIN INFLUENZA VIRUS VAC: HCPCS | Performed by: FAMILY MEDICINE

## 2018-10-05 PROCEDURE — 90662 IIV NO PRSV INCREASED AG IM: CPT | Performed by: FAMILY MEDICINE

## 2018-10-17 ENCOUNTER — OFFICE VISIT (OUTPATIENT)
Dept: CARDIOLOGY | Facility: CLINIC | Age: 68
End: 2018-10-17

## 2018-10-17 VITALS
OXYGEN SATURATION: 99 % | SYSTOLIC BLOOD PRESSURE: 138 MMHG | HEART RATE: 47 BPM | WEIGHT: 202 LBS | BODY MASS INDEX: 29.92 KG/M2 | HEIGHT: 69 IN | DIASTOLIC BLOOD PRESSURE: 80 MMHG

## 2018-10-17 DIAGNOSIS — Z95.1 PRESENCE OF AORTOCORONARY BYPASS GRAFT: Primary | ICD-10-CM

## 2018-10-17 DIAGNOSIS — I10 ESSENTIAL HYPERTENSION: ICD-10-CM

## 2018-10-17 DIAGNOSIS — I25.10 ATHEROSCLEROSIS OF NATIVE CORONARY ARTERY OF NATIVE HEART WITHOUT ANGINA PECTORIS: ICD-10-CM

## 2018-10-17 PROCEDURE — 99214 OFFICE O/P EST MOD 30 MIN: CPT | Performed by: INTERNAL MEDICINE

## 2018-10-17 NOTE — PROGRESS NOTES
Storm Tellez  68 y.o. male    10/17/2018  1. Presence of aortocoronary bypass graft    2. Essential hypertension    3. Atherosclerosis of native coronary artery of native heart without angina pectoris        History of Present Illness    Mr. Tellez is here for follow-up of his above stated problems.  He is eating his post coronary artery bypass surgery.  He had received LIMA to LAD, SVG to distal RCA, SVG to diagonal and free YOUNG to obtuse marginal coronary artery.  He is done reasonably well with no chest pain or shortness of breath.  He has noted that his blood pressure is slightly higher than usual this year.  His heart rate usually remains around the 50s.  He has occasional dizziness when he bends down.  Clinical exam today was unremarkable.  I did not appreciate any carotid bruit.  His blood pressure was in the normal range today.        SUBJECTIVE    No Known Allergies      Past Medical History:   Diagnosis Date   • Bradycardia    • Cancer (CMS/HCC)     skin   • Coronary atherosclerosis of native coronary artery    • Dizziness    • Hypertension    • Presence of aortocoronary bypass graft    • Sleep apnea    • SOB (shortness of breath)          Past Surgical History:   Procedure Laterality Date   • CARDIAC CATHETERIZATION     • CORONARY ARTERY BYPASS GRAFT     • HERNIA REPAIR     • SHOULDER SURGERY           Family History   Problem Relation Age of Onset   • Heart disease Mother    • Heart disease Father          Social History     Social History   • Marital status:      Spouse name: N/A   • Number of children: N/A   • Years of education: N/A     Occupational History   • Not on file.     Social History Main Topics   • Smoking status: Former Smoker   • Smokeless tobacco: Never Used   • Alcohol use No   • Drug use: No   • Sexual activity: Defer     Other Topics Concern   • Not on file     Social History Narrative   • No narrative on file         Current Outpatient Prescriptions   Medication Sig  "Dispense Refill   • aspirin  MG tablet Take 325 mg by mouth Every 6 (Six) Hours As Needed.     • atorvastatin (LIPITOR) 40 MG tablet Take 1 tablet by mouth Daily. 90 tablet 3   • clopidogrel (PLAVIX) 75 MG tablet Take 1 tablet by mouth Daily. 90 tablet 3   • folic acid (FOLVITE) 400 MCG tablet Take 400 mcg by mouth Daily.     • isosorbide mononitrate (IMDUR) 30 MG 24 hr tablet Take 1 tablet by mouth Daily. 90 tablet 3   • lisinopril-hydrochlorothiazide (PRINZIDE,ZESTORETIC) 20-12.5 MG per tablet Take 0.5 tablets by mouth Daily. 90 tablet 2   • nitroglycerin (NITROSTAT) 0.4 MG SL tablet Place 1 tablet under the tongue Every 5 (Five) Minutes As Needed for Chest Pain. Take no more than 3 doses in 15 minutes. 30 tablet 6   • ranolazine (RANEXA) 500 MG 12 hr tablet Take 1 tablet by mouth Every 12 (Twelve) Hours. 180 tablet 3   • amoxicillin-clavulanate (AUGMENTIN) 875-125 MG per tablet      • VENTOLIN  (90 Base) MCG/ACT inhaler Inhale 2 puffs Every 4 (Four) Hours As Needed.       No current facility-administered medications for this visit.          OBJECTIVE    /80   Pulse (!) 47   Ht 175.3 cm (69.02\")   Wt 91.6 kg (202 lb)   SpO2 99%   BMI 29.82 kg/m²         Review of Systems     Constitutional:  Denies recent weight loss, weight gain, fever or chills, no change in exercise tolerance     HENT:  Denies any hearing loss, epistaxis, hoarseness, or difficulty speaking.     Eyes: Wears eyeglasses or contact lenses     Respiratory:  Denies dyspnea with exertion,no cough, wheezing, or hemoptysis.     Cardiovascular: Negative for palpations, chest pain, orthopnea, PND, peripheral edema, syncope, or claudication.  has occasional dizziness    Gastrointestinal:  Denies change in bowel habits, dyspepsia, ulcer disease, hematochezia, or melena.     Endocrine: Negative for cold intolerance, heat intolerance, polydipsia, polyphagia and polyuria.     Genitourinary: Negative.      Musculoskeletal: Denies any " history of arthritic symptoms or back problems     Skin:  Denies any change in hair or nails, rashes, or skin lesions.     Allergic/Immunologic: Negative.  Negative for environmental allergies, food allergies and immunocompromised state.     Neurological:  Denies any history of recurrent headaches, strokes, TIA, or seizure disorder.     Hematological: Denies any food allergies, seasonal allergies, bleeding disorders, or lymphadenopathy.     Psychiatric/Behavioral: Denies any history of depression, substance abuse, or change in cognitive function.         Physical Exam     Constitutional: Cooperative, alert and oriented,  in no acute distress.     HENT:   Head: Normocephalic, normal hair patterns, no masses or tenderness.  Ears, Nose, and Throat: No gross abnormalities. No pallor or cyanosis.  Eyes: EOMS intact, PERRL, conjunctivae and lids unremarkable. Fundoscopic exam and visual fields not performed.   Neck: No palpable masses or adenopathy, no thyromegaly, no JVD, carotid pulses are full and equal bilaterally and without  Bruits.     Cardiovascular: Regular rhythm, S1 and S2 normal, no S3 or S4.  No murmurs, gallops, or rubs detected.     Pulmonary/Chest: Chest: normal symmetry, normal respiratory excursion, no intercostal retraction, no use of accessory muscles.            Pulmonary: Normal breath sounds. No rales or ronchi.    Abdominal: Abdomen soft, bowel sounds normoactive, no masses, no hepatosplenomegaly, non-tender, no bruits.     Musculoskeletal: No deformities, clubbing, cyanosis, erythema, or edema observed. There are no spinal abnormalities noted.    Neurological: No gross motor or sensory deficits noted, affect appropriate, oriented to time, person, place.     Skin: Warm and dry to the touch, no apparent skin lesions or masses noted.     Psychiatric: He has a normal mood and affect. His behavior is normal. Judgment and thought content normal.         Procedures      Lab Results   Component Value Date     WBC 6.79 08/22/2017    HGB 14.9 08/22/2017    HCT 43.0 08/22/2017    MCV 92.9 08/22/2017     08/22/2017     Lab Results   Component Value Date    GLUCOSE 137 (H) 08/22/2017    BUN 17 08/22/2017    CREATININE 0.85 08/22/2017    EGFRIFNONA 90 08/22/2017    BCR 20.0 08/22/2017    CO2 21.0 (L) 08/22/2017    CALCIUM 9.1 08/22/2017    ALBUMIN 4.30 08/22/2017    AST 25 08/22/2017    ALT 24 08/22/2017     No results found for: CHOL  No results found for: TRIG  No results found for: HDL  No components found for: LDLCALC  No results found for: LDL  No results found for: HDLLDLRATIO  No components found for: CHOLHDL  No results found for: HGBA1C  No results found for: TSH, R7NOVHA, L3NLLUT, THYROIDAB        ASSESSMENT AND PLAN  Mr. Tellez is stable as regards to his heart with no definite evidence of angina or congestive heart failure.  His bradycardia is asymptomatic at the present time.  I've encouraged him to drink plenty of fluids up to about 60-70 ounces per day.  I've continued antiplatelet therapy with aspirin and Plavix, antianginal therapy with isosorbide mononitrate and Ranexa, antihypertensive therapy with lisinopril HCTZ.  He will have lab work done by his primary care physician in November this year.    Storm was seen today for follow-up.    Diagnoses and all orders for this visit:    Presence of aortocoronary bypass graft    Essential hypertension    Atherosclerosis of native coronary artery of native heart without angina pectoris        Pradeep Francis MD  10/17/2018  9:06 AM

## 2019-04-08 RX ORDER — RANOLAZINE 500 MG/1
TABLET, FILM COATED, EXTENDED RELEASE ORAL
Qty: 180 TABLET | Refills: 0 | Status: SHIPPED | OUTPATIENT
Start: 2019-04-08 | End: 2019-07-15 | Stop reason: SDUPTHER

## 2019-04-24 ENCOUNTER — OFFICE VISIT (OUTPATIENT)
Dept: CARDIOLOGY | Facility: CLINIC | Age: 69
End: 2019-04-24

## 2019-04-24 VITALS
WEIGHT: 205.8 LBS | DIASTOLIC BLOOD PRESSURE: 80 MMHG | OXYGEN SATURATION: 99 % | HEART RATE: 53 BPM | HEIGHT: 69 IN | BODY MASS INDEX: 30.48 KG/M2 | SYSTOLIC BLOOD PRESSURE: 132 MMHG

## 2019-04-24 DIAGNOSIS — I10 ESSENTIAL HYPERTENSION: ICD-10-CM

## 2019-04-24 DIAGNOSIS — E78.2 MIXED HYPERLIPIDEMIA: ICD-10-CM

## 2019-04-24 DIAGNOSIS — Z95.1 PRESENCE OF AORTOCORONARY BYPASS GRAFT: Primary | ICD-10-CM

## 2019-04-24 PROCEDURE — 99214 OFFICE O/P EST MOD 30 MIN: CPT | Performed by: INTERNAL MEDICINE

## 2019-04-24 NOTE — PROGRESS NOTES
Storm Tellez  68 y.o. male    04/24/2019  1. Presence of aortocoronary bypass graft    2. Essential hypertension    3. Mixed hyperlipidemia        History of Present Illness    Mr. Tellez is here for follow-up of his above stated problems.  He is status post coronary artery bypass surgery in 2005.  He had received LIMA to LAD, SVG to distal RCA, SVG to diagonal and free YOUNG to obtuse marginal coronary artery.  He is done reasonably well with no chest pain or shortness of breath.  Is been compliant with his medication.  He had lab work done by Dr. Ansari in the fall last year and a copy of this will be obtained for our records.  Clinical exam was unremarkable with no signs of congestive heart failure.    SUBJECTIVE    No Known Allergies      Past Medical History:   Diagnosis Date   • Bradycardia    • Cancer (CMS/HCC)     skin   • Coronary atherosclerosis of native coronary artery    • Dizziness    • Hypertension    • Presence of aortocoronary bypass graft    • Sleep apnea    • SOB (shortness of breath)          Past Surgical History:   Procedure Laterality Date   • CARDIAC CATHETERIZATION     • CORONARY ARTERY BYPASS GRAFT     • HERNIA REPAIR     • SHOULDER SURGERY           Family History   Problem Relation Age of Onset   • Heart disease Mother    • Heart disease Father          Social History     Socioeconomic History   • Marital status:      Spouse name: Not on file   • Number of children: Not on file   • Years of education: Not on file   • Highest education level: Not on file   Tobacco Use   • Smoking status: Former Smoker   • Smokeless tobacco: Never Used   Substance and Sexual Activity   • Alcohol use: No   • Drug use: No   • Sexual activity: Defer         Current Outpatient Medications   Medication Sig Dispense Refill   • amoxicillin-clavulanate (AUGMENTIN) 875-125 MG per tablet      • aspirin  MG tablet Take 325 mg by mouth Every 6 (Six) Hours As Needed.     • atorvastatin (LIPITOR) 40  "MG tablet Take 1 tablet by mouth Daily. 90 tablet 3   • clopidogrel (PLAVIX) 75 MG tablet Take 1 tablet by mouth Daily. 90 tablet 3   • folic acid (FOLVITE) 400 MCG tablet Take 400 mcg by mouth Daily.     • isosorbide mononitrate (IMDUR) 30 MG 24 hr tablet Take 1 tablet by mouth Daily. 90 tablet 3   • lisinopril-hydrochlorothiazide (PRINZIDE,ZESTORETIC) 20-12.5 MG per tablet Take 0.5 tablets by mouth Daily. 90 tablet 2   • nitroglycerin (NITROSTAT) 0.4 MG SL tablet Place 1 tablet under the tongue Every 5 (Five) Minutes As Needed for Chest Pain. Take no more than 3 doses in 15 minutes. 30 tablet 6   • RANEXA 500 MG 12 hr tablet TAKE 1 TABLET BY MOUTH EVERY 12 (TWELVE) HOURS. 180 tablet 0   • VENTOLIN  (90 Base) MCG/ACT inhaler Inhale 2 puffs Every 4 (Four) Hours As Needed.       No current facility-administered medications for this visit.          OBJECTIVE    /80   Pulse 53   Ht 175.3 cm (69.02\")   Wt 93.4 kg (205 lb 12.8 oz)   SpO2 99%   BMI 30.38 kg/m²         Review of Systems     Constitutional:  Denies recent weight loss, weight gain, fever or chills, no change in exercise tolerance     HENT:  Denies any hearing loss, epistaxis, hoarseness, or difficulty speaking.     Eyes: Wears eyeglasses or contact lenses     Respiratory:  Denies dyspnea with exertion,no cough, wheezing, or hemoptysis. History of sleep apnea    Cardiovascular: Negative for palpations, chest pain, orthopnea, PND, peripheral edema, syncope, or claudication.     Gastrointestinal:  Denies change in bowel habits, dyspepsia, ulcer disease, hematochezia, or melena.     Endocrine: Negative for cold intolerance, heat intolerance, polydipsia, polyphagia and polyuria.    Genitourinary: Negative.      Musculoskeletal: Denies any history of arthritic symptoms or back problems     Skin:  Denies any change in hair or nails, rashes, or skin lesions.     Allergic/Immunologic: Negative.  Negative for environmental allergies, food allergies " and immunocompromised state.     Neurological:  Denies any history of recurrent headaches, strokes, TIA, or seizure disorder.     Hematological: Denies any food allergies, seasonal allergies, bleeding disorders, or lymphadenopathy.     Psychiatric/Behavioral: Denies any history of depression, substance abuse, or change in cognitive function.         Physical Exam     Constitutional: Cooperative, alert and oriented,  in no acute distress.     HENT:   Head: Normocephalic, normal hair patterns, no masses or tenderness.  Ears, Nose, and Throat: No gross abnormalities. No pallor or cyanosis.   Eyes: EOMS intact, PERRL, conjunctivae and lids unremarkable. Fundoscopic exam and visual fields not performed.   Neck: No palpable masses or adenopathy, no thyromegaly, no JVD, carotid pulses are full and equal bilaterally and without  Bruits.     Cardiovascular: Regular rhythm, S1 and S2 normal, no S3 or S4.  No murmurs, gallops, or rubs detected.     Pulmonary/Chest: Chest: normal symmetry,  normal respiratory excursion, no intercostal retraction, no use of accessory muscles.            Pulmonary: Normal breath sounds. No rales or ronchi.    Abdominal: Abdomen soft, bowel sounds normoactive, no masses, no hepatosplenomegaly, non-tender, no bruits.     Musculoskeletal: No deformities, clubbing, cyanosis, erythema, or edema observed. There are no spinal abnormalities noted.     Neurological: No gross motor or sensory deficits noted, affect appropriate, oriented to time, person, place.     Skin: Warm and dry to the touch, no apparent skin lesions or masses noted.     Psychiatric: He has a normal mood and affect. His behavior is normal. Judgment and thought content normal.         Procedures      Lab Results   Component Value Date    WBC 6.79 08/22/2017    HGB 14.9 08/22/2017    HCT 43.0 08/22/2017    MCV 92.9 08/22/2017     08/22/2017     Lab Results   Component Value Date    GLUCOSE 137 (H) 08/22/2017    BUN 17 08/22/2017     CREATININE 0.85 08/22/2017    EGFRIFNONA 90 08/22/2017    BCR 20.0 08/22/2017    CO2 21.0 (L) 08/22/2017    CALCIUM 9.1 08/22/2017    ALBUMIN 4.30 08/22/2017    AST 25 08/22/2017    ALT 24 08/22/2017     No results found for: CHOL  No results found for: TRIG  No results found for: HDL  No components found for: LDLCALC  No results found for: LDL  No results found for: HDLLDLRATIO  No components found for: CHOLHDL  No results found for: HGBA1C  No results found for: TSH, E1ELXSR, X2VFHJL, THYROIDAB        ASSESSMENT AND PLAN  Mr. Tellez is stable with regards to his heart with no evidence of angina, congestive heart failure.  He has long-standing asymptomatic bradycardia.  His heart rate was 53 bpm.  Antiplatelet therapy with aspirin, Plavix, lipid-lowering therapy with atorvastatin, antianginal therapy with isosorbide mononitrate and Ranexa and antihypertensive therapy with lisinopril HCTZ have been continued.      Storm was seen today for follow-up.    Diagnoses and all orders for this visit:    Presence of aortocoronary bypass graft    Essential hypertension    Mixed hyperlipidemia        Patient's Body mass index is 30.38 kg/m². BMI is above normal parameters. Recommendations include: exercise counseling and nutrition counseling.      Pradeep Francis MD  4/24/2019  9:15 AM

## 2019-07-11 RX ORDER — CLOPIDOGREL BISULFATE 75 MG/1
75 TABLET ORAL DAILY
Qty: 90 TABLET | Refills: 3 | Status: SHIPPED | OUTPATIENT
Start: 2019-07-11 | End: 2020-06-16

## 2019-07-11 RX ORDER — LISINOPRIL AND HYDROCHLOROTHIAZIDE 20; 12.5 MG/1; MG/1
0.5 TABLET ORAL DAILY
Qty: 45 TABLET | Refills: 5 | Status: SHIPPED | OUTPATIENT
Start: 2019-07-11 | End: 2020-06-16

## 2019-07-11 RX ORDER — ATORVASTATIN CALCIUM 40 MG/1
40 TABLET, FILM COATED ORAL DAILY
Qty: 90 TABLET | Refills: 3 | Status: SHIPPED | OUTPATIENT
Start: 2019-07-11 | End: 2020-07-06

## 2019-07-15 RX ORDER — RANOLAZINE 500 MG/1
TABLET, FILM COATED, EXTENDED RELEASE ORAL
Qty: 180 TABLET | Refills: 0 | Status: SHIPPED | OUTPATIENT
Start: 2019-07-15 | End: 2019-10-14 | Stop reason: SDUPTHER

## 2019-08-12 RX ORDER — ISOSORBIDE MONONITRATE 30 MG/1
TABLET, EXTENDED RELEASE ORAL
Qty: 90 TABLET | Refills: 3 | Status: SHIPPED | OUTPATIENT
Start: 2019-08-12 | End: 2020-08-11

## 2019-10-07 ENCOUNTER — FLU SHOT (OUTPATIENT)
Dept: FAMILY MEDICINE CLINIC | Facility: CLINIC | Age: 69
End: 2019-10-07

## 2019-10-07 DIAGNOSIS — Z23 FLU VACCINE NEED: Primary | ICD-10-CM

## 2019-10-07 PROCEDURE — 90662 IIV NO PRSV INCREASED AG IM: CPT | Performed by: FAMILY MEDICINE

## 2019-10-07 PROCEDURE — G0008 ADMIN INFLUENZA VIRUS VAC: HCPCS | Performed by: FAMILY MEDICINE

## 2019-10-14 RX ORDER — RANOLAZINE 500 MG/1
TABLET, EXTENDED RELEASE ORAL
Qty: 180 TABLET | Refills: 0 | Status: SHIPPED | OUTPATIENT
Start: 2019-10-14 | End: 2020-01-09

## 2019-12-03 DIAGNOSIS — R00.1 BRADYCARDIA, SINUS: Primary | ICD-10-CM

## 2019-12-04 ENCOUNTER — OFFICE VISIT (OUTPATIENT)
Dept: CARDIOLOGY | Facility: CLINIC | Age: 69
End: 2019-12-04

## 2019-12-04 VITALS
HEART RATE: 47 BPM | HEIGHT: 69 IN | WEIGHT: 208.3 LBS | BODY MASS INDEX: 30.85 KG/M2 | DIASTOLIC BLOOD PRESSURE: 88 MMHG | SYSTOLIC BLOOD PRESSURE: 132 MMHG | OXYGEN SATURATION: 99 %

## 2019-12-04 DIAGNOSIS — I25.10 ATHEROSCLEROSIS OF NATIVE CORONARY ARTERY OF NATIVE HEART WITHOUT ANGINA PECTORIS: Primary | ICD-10-CM

## 2019-12-04 DIAGNOSIS — I10 ESSENTIAL HYPERTENSION: ICD-10-CM

## 2019-12-04 DIAGNOSIS — Z95.1 PRESENCE OF AORTOCORONARY BYPASS GRAFT: ICD-10-CM

## 2019-12-04 PROCEDURE — 99214 OFFICE O/P EST MOD 30 MIN: CPT | Performed by: INTERNAL MEDICINE

## 2019-12-04 NOTE — PROGRESS NOTES
Storm Tellez  69 y.o. male    12/04/2019  1. Atherosclerosis of native coronary artery of native heart without angina pectoris    2. Presence of aortocoronary bypass graft    3. Essential hypertension        History of Present Illness  Mr. Tellez is here for follow-up of his above stated problems.  He is status post coronary artery bypass surgery in 2005.  He had received LIMA to LAD, SVG to distal RCA, SVG to diagonal and free YOUNG to obtuse marginal coronary artery.  He is done reasonably well with no chest pain or shortness of breath.  Lab work done by Dr. Ansari in November 2019 and cholesterol is 130 with HDL of 46, LDL of 69.  CBC was within normal limits.  Electrolytes were in the normal range.  BUN was 19 with creatinine 0.94.  He has asymptomatic sinus bradycardia and his heart rate was 53 bpm.  No signs of congestive heart failure was noted.    SUBJECTIVE    No Known Allergies      Past Medical History:   Diagnosis Date   • Bradycardia    • Cancer (CMS/HCC)     skin   • Coronary atherosclerosis of native coronary artery    • Dizziness    • Hypertension    • Presence of aortocoronary bypass graft    • Sleep apnea    • SOB (shortness of breath)          Past Surgical History:   Procedure Laterality Date   • CARDIAC CATHETERIZATION     • CORONARY ARTERY BYPASS GRAFT     • HERNIA REPAIR     • SHOULDER SURGERY           Family History   Problem Relation Age of Onset   • Heart disease Mother    • Heart disease Father          Social History     Socioeconomic History   • Marital status:      Spouse name: Not on file   • Number of children: Not on file   • Years of education: Not on file   • Highest education level: Not on file   Tobacco Use   • Smoking status: Former Smoker   • Smokeless tobacco: Never Used   Substance and Sexual Activity   • Alcohol use: No   • Drug use: No   • Sexual activity: Defer         Current Outpatient Medications   Medication Sig Dispense Refill   •  "amoxicillin-clavulanate (AUGMENTIN) 875-125 MG per tablet      • aspirin  MG tablet Take 325 mg by mouth Every 6 (Six) Hours As Needed.     • atorvastatin (LIPITOR) 40 MG tablet Take 1 tablet by mouth Daily. 90 tablet 3   • atorvastatin (LIPITOR) 40 MG tablet TAKE 1 TABLET BY MOUTH DAILY. 90 tablet 3   • clopidogrel (PLAVIX) 75 MG tablet Take 1 tablet by mouth Daily. 90 tablet 3   • clopidogrel (PLAVIX) 75 MG tablet TAKE 1 TABLET BY MOUTH DAILY. 90 tablet 3   • folic acid (FOLVITE) 400 MCG tablet Take 400 mcg by mouth Daily.     • isosorbide mononitrate (IMDUR) 30 MG 24 hr tablet TAKE 1 TABLET BY MOUTH EVERY DAY 90 tablet 3   • lisinopril-hydrochlorothiazide (PRINZIDE,ZESTORETIC) 20-12.5 MG per tablet TAKE 0.5 TABLETS BY MOUTH DAILY. 45 tablet 5   • nitroglycerin (NITROSTAT) 0.4 MG SL tablet Place 1 tablet under the tongue Every 5 (Five) Minutes As Needed for Chest Pain. Take no more than 3 doses in 15 minutes. 30 tablet 6   • ranolazine (RANEXA) 500 MG 12 hr tablet TAKE 1 TABLET BY MOUTH EVERY 12 (TWELVE) HOURS. 180 tablet 0   • VENTOLIN  (90 Base) MCG/ACT inhaler Inhale 2 puffs Every 4 (Four) Hours As Needed.       No current facility-administered medications for this visit.          OBJECTIVE    /88   Pulse (!) 47   Ht 175.3 cm (69.02\")   Wt 94.5 kg (208 lb 4.8 oz)   SpO2 99%   BMI 30.75 kg/m²         Review of Systems     Constitutional:  Denies recent weight loss, weight gain, fever or chills, no change in exercise tolerance     HENT:  Denies any hearing loss, epistaxis, hoarseness, or difficulty speaking.     Eyes: Wears eyeglasses or contact lenses     Respiratory:  Denies dyspnea with exertion,no cough, wheezing, or hemoptysis. History of sleep apnea    Cardiovascular: Negative for palpations, chest pain, orthopnea, PND, peripheral edema, syncope, or claudication.     Gastrointestinal:  Denies change in bowel habits, dyspepsia, ulcer disease, hematochezia, or melena.     Endocrine: " Negative for cold intolerance, heat intolerance, polydipsia, polyphagia and polyuria.     Genitourinary: Negative.      Musculoskeletal: Denies any history of arthritic symptoms or back problems     Skin:  Denies any change in hair or nails, rashes, or skin lesions.     Allergic/Immunologic: Negative.  Negative for environmental allergies, food allergies and immunocompromised state.     Neurological:  Denies any history of recurrent headaches, strokes, TIA, or seizure disorder.     Hematological: Denies any food allergies, seasonal allergies, bleeding disorders, or lymphadenopathy.     Psychiatric/Behavioral: Denies any history of depression, substance abuse, or change in cognitive function.         Physical Exam     Constitutional: Cooperative, alert and oriented,  in no acute distress.     HENT:   Head: Normocephalic, normal hair patterns, no masses or tenderness.  Ears, Nose, and Throat: No gross abnormalities. No pallor or cyanosis.   Eyes: EOMS intact, PERRL, conjunctivae and lids unremarkable. Fundoscopic exam and visual fields not performed.   Neck: No palpable masses or adenopathy, no thyromegaly, no JVD, carotid pulses are full and equal bilaterally and without  Bruits.     Cardiovascular: Regular rhythm, S1 and S2 normal, no S3 or S4.  No murmurs, gallops, or rubs detected.     Pulmonary/Chest: Chest: normal symmetry, no tenderness to palpation, normal respiratory excursion, no intercostal retraction, no use of accessory muscles.            Pulmonary: Normal breath sounds. No rales or ronchi.    Abdominal: Abdomen soft, bowel sounds normoactive, no masses, no hepatosplenomegaly, non-tender, no bruits.     Musculoskeletal: No deformities, clubbing, cyanosis, erythema, or edema observed. There are no spinal abnormalities noted. Normal muscle strength and tone. Pulses full and equal in all extremities, no bruits auscultated.     Neurological: No gross motor or sensory deficits noted, affect appropriate,  oriented to time, person, place.     Skin: Warm and dry to the touch, no apparent skin lesions or masses noted.     Psychiatric: He has a normal mood and affect. His behavior is normal. Judgment and thought content normal.         Procedures      Lab Results   Component Value Date    WBC 6.79 08/22/2017    HGB 14.9 08/22/2017    HCT 43.0 08/22/2017    MCV 92.9 08/22/2017     08/22/2017     Lab Results   Component Value Date    GLUCOSE 137 (H) 08/22/2017    BUN 17 08/22/2017    CREATININE 0.85 08/22/2017    EGFRIFNONA 90 08/22/2017    BCR 20.0 08/22/2017    CO2 21.0 (L) 08/22/2017    CALCIUM 9.1 08/22/2017    ALBUMIN 4.30 08/22/2017    AST 25 08/22/2017    ALT 24 08/22/2017     No results found for: CHOL  No results found for: TRIG  No results found for: HDL  No components found for: LDLCALC  No results found for: LDL  No results found for: HDLLDLRATIO  No components found for: CHOLHDL  No results found for: HGBA1C  No results found for: TSH, W5LTGTP, O3NSTGW, THYROIDAB        ASSESSMENT AND PLAN  Mr. Tellez is stable with no clinical evidence of angina or congestive heart failure.  He is 14 years post coronary artery bypass surgery.  His lipid profiles are in the normal range.  I have continued antiplatelet therapy with aspirin and Plavix, antianginal therapy with isosorbide mononitrate and Ranexa and antihypertensive therapy with lisinopril HCTZ has been continued.  Lipid-lowering therapy with atorvastatin has been continued.    Storm was seen today for follow-up.    Diagnoses and all orders for this visit:    Atherosclerosis of native coronary artery of native heart without angina pectoris    Presence of aortocoronary bypass graft    Essential hypertension        Patient's Body mass index is 30.75 kg/m². BMI is above normal parameters. Recommendations include: exercise counseling and nutrition counseling.  Patient is a non-smoker    Pradeep Francis MD  12/4/2019  9:06 AM

## 2020-01-09 RX ORDER — RANOLAZINE 500 MG/1
TABLET, EXTENDED RELEASE ORAL
Qty: 180 TABLET | Refills: 1 | Status: SHIPPED | OUTPATIENT
Start: 2020-01-09 | End: 2020-05-14

## 2020-05-14 RX ORDER — RANOLAZINE 500 MG/1
TABLET, EXTENDED RELEASE ORAL
Qty: 180 TABLET | Refills: 2 | Status: SHIPPED | OUTPATIENT
Start: 2020-05-14 | End: 2021-01-29

## 2020-06-15 DIAGNOSIS — Z95.1 PRESENCE OF AORTOCORONARY BYPASS GRAFT: Primary | ICD-10-CM

## 2020-06-16 ENCOUNTER — OFFICE VISIT (OUTPATIENT)
Dept: CARDIOLOGY | Facility: CLINIC | Age: 70
End: 2020-06-16

## 2020-06-16 VITALS
HEART RATE: 67 BPM | BODY MASS INDEX: 31.29 KG/M2 | DIASTOLIC BLOOD PRESSURE: 90 MMHG | WEIGHT: 212 LBS | SYSTOLIC BLOOD PRESSURE: 190 MMHG | OXYGEN SATURATION: 97 %

## 2020-06-16 DIAGNOSIS — E78.2 MIXED HYPERLIPIDEMIA: ICD-10-CM

## 2020-06-16 DIAGNOSIS — I25.2 OLD MI (MYOCARDIAL INFARCTION): ICD-10-CM

## 2020-06-16 DIAGNOSIS — I10 ESSENTIAL HYPERTENSION: ICD-10-CM

## 2020-06-16 DIAGNOSIS — I51.9 ASYMPTOMATIC LV DYSFUNCTION: ICD-10-CM

## 2020-06-16 DIAGNOSIS — Z95.1 PRESENCE OF AORTOCORONARY BYPASS GRAFT: Primary | ICD-10-CM

## 2020-06-16 PROCEDURE — 99214 OFFICE O/P EST MOD 30 MIN: CPT | Performed by: INTERNAL MEDICINE

## 2020-06-16 RX ORDER — LISINOPRIL AND HYDROCHLOROTHIAZIDE 20; 12.5 MG/1; MG/1
1 TABLET ORAL DAILY
Qty: 90 TABLET | Refills: 3 | Status: SHIPPED
Start: 2020-06-16 | End: 2020-07-09 | Stop reason: SDUPTHER

## 2020-06-16 NOTE — PROGRESS NOTES
Storm Tellez  69 y.o. male    1. Presence of aortocoronary bypass graft    2. Essential hypertension    3. Mixed hyperlipidemia    4. Old MI (myocardial infarction)    5. Asymptomatic LV dysfunction        History of Present Illness  Mr. Tellez is here for follow-up of his above stated problems.  He is status post coronary artery bypass surgery in 2005.  He had received LIMA to LAD, SVG to distal RCA, SVG to diagonal and free YOUNG to obtuse marginal coronary artery.  He is done reasonably well with no chest pain or shortness of breath.  Lab work done by Dr. Ansari in November 2019 and cholesterol is 130 with HDL of 46, LDL of 69.  CBC was within normal limits.  Electrolytes were in the normal range.  BUN was 19 with creatinine 0.94.  He recently had a telephone visit with Dr. Ansari and I understand that lab work will be performed on his next visit with him.    He denied any chest pain or shortness of breath blood pressure was noted to be elevated at 190/90 mmHg.  He denied any dizziness or syncope.  He elevated his blood pressure has been normal at home.  No signs of congestive heart failure was noted.  Echocardiogram in 2016 did show mild LV dysfunction with an ejection fraction of 45 to 50%.    SUBJECTIVE    No Known Allergies      Past Medical History:   Diagnosis Date   • Bradycardia    • Cancer (CMS/HCC)     skin   • Coronary atherosclerosis of native coronary artery    • Dizziness    • Hypertension    • Presence of aortocoronary bypass graft    • Sleep apnea    • SOB (shortness of breath)          Past Surgical History:   Procedure Laterality Date   • CARDIAC CATHETERIZATION     • CORONARY ARTERY BYPASS GRAFT     • HERNIA REPAIR     • SHOULDER SURGERY           Family History   Problem Relation Age of Onset   • Heart disease Mother    • Heart disease Father          Social History     Socioeconomic History   • Marital status:      Spouse name: Not on file   • Number of children: Not on file    • Years of education: Not on file   • Highest education level: Not on file   Tobacco Use   • Smoking status: Former Smoker   • Smokeless tobacco: Never Used   Substance and Sexual Activity   • Alcohol use: No   • Drug use: No   • Sexual activity: Defer         Current Outpatient Medications   Medication Sig Dispense Refill   • aspirin  MG tablet Take 325 mg by mouth Every 6 (Six) Hours As Needed.     • atorvastatin (LIPITOR) 40 MG tablet TAKE 1 TABLET BY MOUTH DAILY. 90 tablet 3   • clopidogrel (PLAVIX) 75 MG tablet Take 1 tablet by mouth Daily. 90 tablet 3   • folic acid (FOLVITE) 400 MCG tablet Take 400 mcg by mouth Daily.     • isosorbide mononitrate (IMDUR) 30 MG 24 hr tablet TAKE 1 TABLET BY MOUTH EVERY DAY 90 tablet 3   • lisinopril-hydrochlorothiazide (PRINZIDE,ZESTORETIC) 20-12.5 MG per tablet TAKE 0.5 TABLETS BY MOUTH DAILY. 45 tablet 5   • nitroglycerin (NITROSTAT) 0.4 MG SL tablet Place 1 tablet under the tongue Every 5 (Five) Minutes As Needed for Chest Pain. Take no more than 3 doses in 15 minutes. 30 tablet 6   • ranolazine (RANEXA) 500 MG 12 hr tablet TAKE 1 TABLET BY MOUTH EVERY 12 (TWELVE) HOURS. 180 tablet 2   • VENTOLIN  (90 Base) MCG/ACT inhaler Inhale 2 puffs Every 4 (Four) Hours As Needed.       No current facility-administered medications for this visit.          OBJECTIVE    BP (!) 190/90 (BP Location: Right arm, Patient Position: Sitting, Cuff Size: Adult)   Pulse 67   Wt 96.2 kg (212 lb)   SpO2 97%   BMI 31.29 kg/m²         Review of Systems     Constitutional:  Denies recent weight loss, weight gain, fever or chills, no change in exercise tolerance     HENT:  Denies any hearing loss, epistaxis, hoarseness, or difficulty speaking.     Eyes: Wears eyeglasses or contact lenses     Respiratory:  Denies dyspnea with exertion,no cough, wheezing, or hemoptysis. History of sleep apnea    Cardiovascular: Negative for palpations, chest pain, orthopnea, PND, peripheral edema,  syncope, or claudication.     Gastrointestinal:  Denies change in bowel habits, dyspepsia, ulcer disease, hematochezia, or melena.     Endocrine: Negative for cold intolerance, heat intolerance, polydipsia, polyphagia and polyuria.     Genitourinary: Negative.      Musculoskeletal: Denies any history of arthritic symptoms or back problems     Skin:  Denies any change in hair or nails, rashes, or skin lesions.     Allergic/Immunologic: Negative.  Negative for environmental allergies, food allergies and immunocompromised state.     Neurological:  Denies any history of recurrent headaches, strokes, TIA, or seizure disorder.     Hematological: Denies any food allergies, seasonal allergies, bleeding disorders, or lymphadenopathy.     Psychiatric/Behavioral: Denies any history of depression, substance abuse, or change in cognitive function.         Physical Exam     Constitutional: Cooperative, alert and oriented,  in no acute distress.     HENT:   Head: Normocephalic, normal hair patterns, no masses or tenderness.  Ears, Nose, and Throat: No gross abnormalities. No pallor or cyanosis.   Eyes: EOMS intact, PERRL, conjunctivae and lids unremarkable. Fundoscopic exam and visual fields not performed.   Neck: No palpable masses or adenopathy, no thyromegaly, no JVD, carotid pulses are full and equal bilaterally and without  Bruits.     Cardiovascular: Regular rhythm, S1 and S2 normal, no S3 or S4.  No murmurs, gallops, or rubs detected.     Pulmonary/Chest: Chest: normal symmetry, no tenderness to palpation, normal respiratory excursion, no intercostal retraction, no use of accessory muscles.            Pulmonary: Normal breath sounds. No rales or ronchi.    Abdominal: Abdomen soft, bowel sounds normoactive, no masses, no hepatosplenomegaly, non-tender, no bruits.     Musculoskeletal: No deformities, clubbing, cyanosis, erythema, or edema observed. There are no spinal abnormalities noted. Normal muscle strength and tone.  Pulses full and equal in all extremities, no bruits auscultated.     Neurological: No gross motor or sensory deficits noted, affect appropriate, oriented to time, person, place.     Skin: Warm and dry to the touch, no apparent skin lesions or masses noted.     Psychiatric: He has a normal mood and affect. His behavior is normal. Judgment and thought content normal.         Procedures      Lab Results   Component Value Date    WBC 6.79 08/22/2017    HGB 14.9 08/22/2017    HCT 43.0 08/22/2017    MCV 92.9 08/22/2017     08/22/2017     Lab Results   Component Value Date    GLUCOSE 137 (H) 08/22/2017    BUN 17 08/22/2017    CREATININE 0.85 08/22/2017    EGFRIFNONA 90 08/22/2017    BCR 20.0 08/22/2017    CO2 21.0 (L) 08/22/2017    CALCIUM 9.1 08/22/2017    ALBUMIN 4.30 08/22/2017    AST 25 08/22/2017    ALT 24 08/22/2017     No results found for: CHOL  No results found for: TRIG  No results found for: HDL  No components found for: LDLCALC  No results found for: LDL  No results found for: HDLLDLRATIO  No components found for: CHOLHDL  No results found for: HGBA1C  No results found for: TSH, M7DLBDR, [id number], THYROIDAB        ASSESSMENT AND PLAN  Mr. Tellez is stable with no clinical evidence of angina or congestive heart failure.  He is 15 years post coronary artery bypass surgery.  His lipid profiles are in the normal range.  I have continued antiplatelet therapy with aspirin and Plavix, antianginal therapy with isosorbide mononitrate and Ranexa and for optimization of blood pressure and increase the dose of lisinopril HCTZ to 20/12.5 mg daily. Lipid-lowering therapy with atorvastatin has been continued.  An echocardiogram to reassess left ventricular and valvular function has been arranged.  I advised him to keep a close watch on his blood pressure.    Storm was seen today for follow-up.    Diagnoses and all orders for this visit:    Presence of aortocoronary bypass graft  -     Adult Transthoracic Echo Complete  W/ Cont if Necessary Per Protocol; Future    Essential hypertension  -     Adult Transthoracic Echo Complete W/ Cont if Necessary Per Protocol; Future    Mixed hyperlipidemia  -     Adult Transthoracic Echo Complete W/ Cont if Necessary Per Protocol; Future    Old MI (myocardial infarction)  -     Adult Transthoracic Echo Complete W/ Cont if Necessary Per Protocol; Future    Asymptomatic LV dysfunction  -     Adult Transthoracic Echo Complete W/ Cont if Necessary Per Protocol; Future        Patient's Body mass index is 31.29 kg/m². BMI is above normal parameters. Recommendations include: exercise counseling and nutrition counseling.  Patient is a non-smoker    Pradeep Francis MD  6/16/2020  10:20

## 2020-07-06 RX ORDER — ATORVASTATIN CALCIUM 40 MG/1
40 TABLET, FILM COATED ORAL DAILY
Qty: 90 TABLET | Refills: 3 | Status: SHIPPED | OUTPATIENT
Start: 2020-07-06 | End: 2021-06-29

## 2020-07-06 RX ORDER — CLOPIDOGREL BISULFATE 75 MG/1
75 TABLET ORAL DAILY
Qty: 90 TABLET | Refills: 3 | Status: SHIPPED | OUTPATIENT
Start: 2020-07-06 | End: 2021-06-29

## 2020-07-09 RX ORDER — LISINOPRIL AND HYDROCHLOROTHIAZIDE 20; 12.5 MG/1; MG/1
1 TABLET ORAL DAILY
Qty: 90 TABLET | Refills: 3
Start: 2020-07-09 | End: 2020-08-27

## 2020-07-10 ENCOUNTER — DOCUMENTATION (OUTPATIENT)
Dept: CARDIOLOGY | Facility: CLINIC | Age: 70
End: 2020-07-10

## 2020-07-10 NOTE — PROGRESS NOTES
Echo results discussed with patient. Per dr cabrera echo ok, no change in valve leaks. Patient verbalized understanding

## 2020-08-11 RX ORDER — ISOSORBIDE MONONITRATE 30 MG/1
TABLET, EXTENDED RELEASE ORAL
Qty: 90 TABLET | Refills: 3 | Status: SHIPPED | OUTPATIENT
Start: 2020-08-11 | End: 2021-08-16

## 2020-08-27 RX ORDER — LISINOPRIL AND HYDROCHLOROTHIAZIDE 20; 12.5 MG/1; MG/1
TABLET ORAL
Qty: 45 TABLET | Refills: 5 | Status: SHIPPED | OUTPATIENT
Start: 2020-08-27 | End: 2020-09-01 | Stop reason: SDUPTHER

## 2020-09-01 RX ORDER — LISINOPRIL AND HYDROCHLOROTHIAZIDE 20; 12.5 MG/1; MG/1
1 TABLET ORAL DAILY
Qty: 45 TABLET | Refills: 5 | Status: SHIPPED | OUTPATIENT
Start: 2020-09-01 | End: 2021-06-17

## 2020-12-17 RX ORDER — LISINOPRIL AND HYDROCHLOROTHIAZIDE 20; 12.5 MG/1; MG/1
TABLET ORAL
Qty: 90 TABLET | Refills: 2 | OUTPATIENT
Start: 2020-12-17

## 2020-12-17 NOTE — TELEPHONE ENCOUNTER
Called pharmacy to verify pt still had refills for the Lisinopril HCTZ. Called pt to let him know that he still had 2 refills available and the pharmacy is refilling it for him now. Pt verbalized understanding.

## 2020-12-29 ENCOUNTER — HOSPITAL ENCOUNTER (OUTPATIENT)
Dept: MRI IMAGING | Facility: HOSPITAL | Age: 70
Discharge: HOME | End: 2020-12-29
Admitting: FAMILY MEDICINE

## 2020-12-29 DIAGNOSIS — M25.562 LEFT KNEE PAIN, UNSPECIFIED CHRONICITY: ICD-10-CM

## 2020-12-29 PROCEDURE — 73721 MRI JNT OF LWR EXTRE W/O DYE: CPT

## 2021-01-13 ENCOUNTER — OFFICE VISIT (OUTPATIENT)
Dept: CARDIOLOGY | Facility: CLINIC | Age: 71
End: 2021-01-13

## 2021-01-13 VITALS
WEIGHT: 219.8 LBS | HEIGHT: 69 IN | OXYGEN SATURATION: 100 % | SYSTOLIC BLOOD PRESSURE: 178 MMHG | BODY MASS INDEX: 32.56 KG/M2 | DIASTOLIC BLOOD PRESSURE: 86 MMHG | HEART RATE: 89 BPM

## 2021-01-13 DIAGNOSIS — E78.2 MIXED HYPERLIPIDEMIA: ICD-10-CM

## 2021-01-13 DIAGNOSIS — I25.10 ATHEROSCLEROSIS OF NATIVE CORONARY ARTERY OF NATIVE HEART WITHOUT ANGINA PECTORIS: Primary | ICD-10-CM

## 2021-01-13 DIAGNOSIS — I10 ESSENTIAL HYPERTENSION: ICD-10-CM

## 2021-01-13 DIAGNOSIS — Z95.1 PRESENCE OF AORTOCORONARY BYPASS GRAFT: ICD-10-CM

## 2021-01-13 PROCEDURE — 93000 ELECTROCARDIOGRAM COMPLETE: CPT | Performed by: INTERNAL MEDICINE

## 2021-01-13 PROCEDURE — 99214 OFFICE O/P EST MOD 30 MIN: CPT | Performed by: INTERNAL MEDICINE

## 2021-01-13 NOTE — PROGRESS NOTES
Storm Tellez  70 y.o. male    1. Atherosclerosis of native coronary artery of native heart without angina pectoris    2. Presence of aortocoronary bypass graft    3. Essential hypertension    4. Mixed hyperlipidemia        History of Present Illness  Mr. Tellez is here for follow-up of his above stated problems.  He is status post coronary artery bypass surgery in 2005.  He had received LIMA to LAD, SVG to distal RCA, SVG to diagonal and free YOUNG to obtuse marginal coronary artery.     The patient has done well from a cardiac standpoint and denied any chest pain, shortness of breath, palpitation or dizziness.  He has been compliant with his medications.  He had lab work done by Dr. Ansari about 6 weeks prior and a copy of the results will be obtained and reviewed.    Patient had left knee meniscal surgery last week.  He is recovering from this.    Echocardiogram in July 2020 showed:  · Estimated EF = 54%.  · Left ventricular systolic function is normal.  · Right ventricular cavity is borderline dilated. Normal systolic function  · Left atrial cavity size is moderately dilated.  · Mild mitral valve regurgitation is present  · Mild tricuspid valve regurgitation is present.  · Mild pulmonic valve regurgitation is present.    EKG today showed sinus rhythm with baseline artifacts.  Right axis deviation consistent with left posterior fascicular block.  Poor R wave progression V1 and V2.    Clinical exam today showed no bronchospasm no signs of congestive heart failure.  Her blood pressure was mildly elevated.  He indicated that he had to rush for this appointment today and will keep an eye on it.    SUBJECTIVE    No Known Allergies      Past Medical History:   Diagnosis Date   • Bradycardia    • Cancer (CMS/HCC)     skin   • Coronary atherosclerosis of native coronary artery    • Dizziness    • Hypertension    • Presence of aortocoronary bypass graft    • Sleep apnea    • SOB (shortness of breath)          Past  "Surgical History:   Procedure Laterality Date   • CARDIAC CATHETERIZATION     • CORONARY ARTERY BYPASS GRAFT     • HERNIA REPAIR     • SHOULDER SURGERY           Family History   Problem Relation Age of Onset   • Heart disease Mother    • Heart disease Father          Social History     Socioeconomic History   • Marital status:      Spouse name: Not on file   • Number of children: Not on file   • Years of education: Not on file   • Highest education level: Not on file   Tobacco Use   • Smoking status: Former Smoker   • Smokeless tobacco: Never Used   Substance and Sexual Activity   • Alcohol use: No   • Drug use: No   • Sexual activity: Defer         Current Outpatient Medications   Medication Sig Dispense Refill   • aspirin  MG tablet Take 325 mg by mouth Every 6 (Six) Hours As Needed.     • atorvastatin (LIPITOR) 40 MG tablet TAKE 1 TABLET BY MOUTH DAILY. 90 tablet 3   • clopidogrel (PLAVIX) 75 MG tablet TAKE 1 TABLET BY MOUTH DAILY. 90 tablet 3   • folic acid (FOLVITE) 400 MCG tablet Take 400 mcg by mouth Daily.     • isosorbide mononitrate (IMDUR) 30 MG 24 hr tablet TAKE 1 TABLET BY MOUTH EVERY DAY 90 tablet 3   • lisinopril-hydrochlorothiazide (PRINZIDE,ZESTORETIC) 20-12.5 MG per tablet Take 1 tablet by mouth Daily. 45 tablet 5   • nitroglycerin (NITROSTAT) 0.4 MG SL tablet Place 1 tablet under the tongue Every 5 (Five) Minutes As Needed for Chest Pain. Take no more than 3 doses in 15 minutes. 30 tablet 6   • ranolazine (RANEXA) 500 MG 12 hr tablet TAKE 1 TABLET BY MOUTH EVERY 12 (TWELVE) HOURS. 180 tablet 2   • VENTOLIN  (90 Base) MCG/ACT inhaler Inhale 2 puffs Every 4 (Four) Hours As Needed.       No current facility-administered medications for this visit.          OBJECTIVE    /86 (BP Location: Left arm, Patient Position: Sitting, Cuff Size: Adult)   Pulse 89   Ht 175.3 cm (69.02\")   Wt 99.7 kg (219 lb 12.8 oz)   SpO2 100%   BMI 32.44 kg/m²         Review of Systems : The " following systems were reviewed and no changes were noted    Constitutional:  Denies recent weight loss, weight gain, fever or chills, no change in exercise tolerance     HENT:  Denies any hearing loss, epistaxis, hoarseness, or difficulty speaking.     Eyes: Wears eyeglasses or contact lenses     Respiratory:  Denies dyspnea with exertion,no cough, wheezing, or hemoptysis. History of sleep apnea    Cardiovascular: Negative for palpations, chest pain, orthopnea, PND, peripheral edema, syncope, or claudication.     Gastrointestinal:  Denies change in bowel habits, dyspepsia, ulcer disease, hematochezia, or melena.     Endocrine: Negative for cold intolerance, heat intolerance, polydipsia, polyphagia and polyuria.     Genitourinary: Negative.      Musculoskeletal: DJD.  History of recent meniscal surgery left knee    Neurological:  Denies any history of recurrent headaches, strokes, TIA, or seizure disorder.     Hematological: Denies any food allergies, seasonal allergies, bleeding disorders, or lymphadenopathy.     Psychiatric/Behavioral: Denies any history of depression, substance abuse, or change in cognitive function.         Physical Exam     Constitutional: Cooperative, alert and oriented,  in no acute distress.     HENT:   Head: Normocephalic, normal hair patterns, no masses or tenderness.  Ears, Nose, and Throat: No gross abnormalities. No pallor or cyanosis.   Eyes: EOMS intact, PERRL, conjunctivae and lids unremarkable. Fundoscopic exam and visual fields not performed.   Neck: No palpable masses or adenopathy, no thyromegaly, no JVD, carotid pulses are full and equal bilaterally and without  Bruits.     Cardiovascular: Regular rhythm, S1 and S2 normal, no S3 or S4.  No murmurs, gallops, or rubs detected.     Pulmonary/Chest: Chest: normal symmetry, no tenderness to palpation, normal respiratory excursion, no intercostal retraction, no use of accessory muscles.            Pulmonary: Normal breath sounds. No  rales or ronchi.    Abdominal: Abdomen soft, bowel sounds normoactive, no masses, no hepatosplenomegaly, non-tender, no bruits.     Musculoskeletal: No deformities, clubbing, cyanosis, erythema, or edema observed.     Neurological: No gross motor or sensory deficits noted, affect appropriate, oriented to time, person, place.     Skin: Warm and dry to the touch, no apparent skin lesions or masses noted.     Psychiatric: He has a normal mood and affect. His behavior is normal. Judgment and thought content normal.         Procedures      Lab Results   Component Value Date    WBC 6.79 08/22/2017    HGB 14.9 08/22/2017    HCT 43.0 08/22/2017    MCV 92.9 08/22/2017     08/22/2017     Lab Results   Component Value Date    GLUCOSE 137 (H) 08/22/2017    BUN 17 08/22/2017    CREATININE 0.85 08/22/2017    EGFRIFNONA 90 08/22/2017    BCR 20.0 08/22/2017    CO2 21.0 (L) 08/22/2017    CALCIUM 9.1 08/22/2017    ALBUMIN 4.30 08/22/2017    AST 25 08/22/2017    ALT 24 08/22/2017     No results found for: CHOL  No results found for: TRIG  No results found for: HDL  No components found for: LDLCALC  No results found for: LDL  No results found for: HDLLDLRATIO  No components found for: CHOLHDL  No results found for: HGBA1C  No results found for: TSH, Y1QPNVJ, O1EKPDS, THYROIDAB        ASSESSMENT AND PLAN  Mr. Tellez is stable with no clinical evidence of angina or congestive heart failure.  He is 15 + years post coronary artery bypass surgery.  His lipid profiles were in the normal range when checked last year in November.    I have continued antiplatelet therapy with aspirin and Plavix, antianginal therapy with isosorbide mononitrate and Ranexa and antihypertensive therapy with lisinopril HCTZ.  If his blood pressure is consistently elevated we could add amlodipine 5 mg in p.m.  I have advised him to keep a close watch on his blood pressure.    Diagnoses and all orders for this visit:    1. Atherosclerosis of native coronary  artery of native heart without angina pectoris (Primary)  -     ECG 12 Lead    2. Presence of aortocoronary bypass graft    3. Essential hypertension    4. Mixed hyperlipidemia        Patient's Body mass index is 32.44 kg/m². BMI is above normal parameters. Recommendations include: exercise counseling and nutrition counseling.  Patient is a non-smoker    Pradeep Francis MD  1/13/2021  08:38 CST

## 2021-01-14 LAB
QT INTERVAL: 400 MS
QTC INTERVAL: 444 MS

## 2021-01-29 RX ORDER — RANOLAZINE 500 MG/1
TABLET, EXTENDED RELEASE ORAL
Qty: 180 TABLET | Refills: 2 | Status: SHIPPED | OUTPATIENT
Start: 2021-01-29 | End: 2021-10-25

## 2021-03-22 ENCOUNTER — IMMUNIZATION (OUTPATIENT)
Dept: VACCINE CLINIC | Facility: HOSPITAL | Age: 71
End: 2021-03-22

## 2021-03-22 PROCEDURE — 91300 HC SARSCOV02 VAC 30MCG/0.3ML IM: CPT | Performed by: NURSE PRACTITIONER

## 2021-03-22 PROCEDURE — 0001A: CPT | Performed by: NURSE PRACTITIONER

## 2021-04-12 ENCOUNTER — IMMUNIZATION (OUTPATIENT)
Dept: VACCINE CLINIC | Facility: HOSPITAL | Age: 71
End: 2021-04-12

## 2021-04-12 PROCEDURE — 0002A: CPT | Performed by: THORACIC SURGERY (CARDIOTHORACIC VASCULAR SURGERY)

## 2021-04-12 PROCEDURE — 91300 HC SARSCOV02 VAC 30MCG/0.3ML IM: CPT | Performed by: THORACIC SURGERY (CARDIOTHORACIC VASCULAR SURGERY)

## 2021-06-17 RX ORDER — LISINOPRIL AND HYDROCHLOROTHIAZIDE 20; 12.5 MG/1; MG/1
TABLET ORAL
Qty: 90 TABLET | Refills: 2 | Status: SHIPPED | OUTPATIENT
Start: 2021-06-17 | End: 2022-03-14

## 2021-06-29 RX ORDER — CLOPIDOGREL BISULFATE 75 MG/1
75 TABLET ORAL DAILY
Qty: 90 TABLET | Refills: 3 | Status: SHIPPED | OUTPATIENT
Start: 2021-06-29 | End: 2022-07-11 | Stop reason: SDUPTHER

## 2021-06-29 RX ORDER — ATORVASTATIN CALCIUM 40 MG/1
40 TABLET, FILM COATED ORAL DAILY
Qty: 90 TABLET | Refills: 3 | Status: SHIPPED | OUTPATIENT
Start: 2021-06-29 | End: 2022-07-11 | Stop reason: SDUPTHER

## 2021-07-16 ENCOUNTER — OFFICE VISIT (OUTPATIENT)
Dept: CARDIOLOGY | Facility: CLINIC | Age: 71
End: 2021-07-16

## 2021-07-16 VITALS
DIASTOLIC BLOOD PRESSURE: 78 MMHG | HEART RATE: 65 BPM | WEIGHT: 215.6 LBS | SYSTOLIC BLOOD PRESSURE: 152 MMHG | TEMPERATURE: 97.8 F | HEIGHT: 69 IN | OXYGEN SATURATION: 98 % | BODY MASS INDEX: 31.93 KG/M2

## 2021-07-16 DIAGNOSIS — I10 ESSENTIAL HYPERTENSION: ICD-10-CM

## 2021-07-16 DIAGNOSIS — I25.10 ATHEROSCLEROSIS OF NATIVE CORONARY ARTERY OF NATIVE HEART WITHOUT ANGINA PECTORIS: ICD-10-CM

## 2021-07-16 DIAGNOSIS — Z95.1 PRESENCE OF AORTOCORONARY BYPASS GRAFT: Primary | ICD-10-CM

## 2021-07-16 PROCEDURE — 99213 OFFICE O/P EST LOW 20 MIN: CPT | Performed by: INTERNAL MEDICINE

## 2021-07-16 NOTE — PROGRESS NOTES
Storm Tellez  70 y.o. male    1. Presence of aortocoronary bypass graft    2. Atherosclerosis of native coronary artery of native heart without angina pectoris    3. Essential hypertension        History of Present Illness  Mr. Tellez is here for follow-up of his above stated problems.  He is status post coronary artery bypass surgery in 2005.  He had received LIMA to LAD, SVG to distal RCA, SVG to diagonal and free YOUNG to obtuse marginal coronary artery.     The patient has progressed well and denied any chest pain, shortness of breath, palpitation or dizziness.  His blood pressure was noted to be mildly elevated today though he indicates that it is in the normal range at home.  He takes lisinopril HCTZ 20/12.5 mg (half tablet twice a day) along with isosorbide mononitrate.    Echocardiogram in July 2020 showed ejection fraction of 54%.  Right ventricle was borderline dilated with normal systolic function.  Left atrium was moderately dilated.  There is mild mitral and tricuspid regurgitation with mild pulmonary valve insufficiency.      No Known Allergies      Past Medical History:   Diagnosis Date   • Bradycardia    • Cancer (CMS/HCC)     skin   • Coronary atherosclerosis of native coronary artery    • Dizziness    • Hypertension    • Presence of aortocoronary bypass graft    • Sleep apnea    • SOB (shortness of breath)          Past Surgical History:   Procedure Laterality Date   • CARDIAC CATHETERIZATION     • CORONARY ARTERY BYPASS GRAFT     • HERNIA REPAIR     • SHOULDER SURGERY           Family History   Problem Relation Age of Onset   • Heart disease Mother    • Heart disease Father          Social History     Socioeconomic History   • Marital status:      Spouse name: Not on file   • Number of children: Not on file   • Years of education: Not on file   • Highest education level: Not on file   Tobacco Use   • Smoking status: Former Smoker   • Smokeless tobacco: Never Used   Substance and Sexual  "Activity   • Alcohol use: No   • Drug use: No   • Sexual activity: Defer         Current Outpatient Medications   Medication Sig Dispense Refill   • aspirin  MG tablet Take 325 mg by mouth Every 6 (Six) Hours As Needed.     • atorvastatin (LIPITOR) 40 MG tablet TAKE 1 TABLET BY MOUTH DAILY. 90 tablet 3   • clopidogrel (PLAVIX) 75 MG tablet TAKE 1 TABLET BY MOUTH DAILY. 90 tablet 3   • folic acid (FOLVITE) 400 MCG tablet Take 400 mcg by mouth Daily.     • isosorbide mononitrate (IMDUR) 30 MG 24 hr tablet TAKE 1 TABLET BY MOUTH EVERY DAY 90 tablet 3   • lisinopril-hydrochlorothiazide (PRINZIDE,ZESTORETIC) 20-12.5 MG per tablet TAKE 1 TABLET BY MOUTH EVERY DAY 90 tablet 2   • nitroglycerin (NITROSTAT) 0.4 MG SL tablet Place 1 tablet under the tongue Every 5 (Five) Minutes As Needed for Chest Pain. Take no more than 3 doses in 15 minutes. 30 tablet 6   • ranolazine (RANEXA) 500 MG 12 hr tablet TAKE 1 TABLET BY MOUTH EVERY 12 (TWELVE) HOURS. 180 tablet 2     No current facility-administered medications for this visit.         OBJECTIVE    /78 (BP Location: Left arm, Patient Position: Sitting, Cuff Size: Adult)   Pulse 65   Temp 97.8 °F (36.6 °C)   Ht 175.3 cm (69.02\")   Wt 97.8 kg (215 lb 9.6 oz)   SpO2 98%   BMI 31.82 kg/m²         Review of Systems : The following systems were reviewed and no changes were noted    Constitutional:  Denies recent weight loss, weight gain, fever or chills, no change in exercise tolerance     HENT:  Denies any hearing loss, epistaxis, hoarseness, or difficulty speaking.     Eyes: Wears eyeglasses or contact lenses     Respiratory:  Denies dyspnea with exertion,no cough, wheezing, or hemoptysis. History of sleep apnea    Cardiovascular: Negative for palpations, chest pain, orthopnea, PND, peripheral edema, syncope, or claudication.     Gastrointestinal:  Denies change in bowel habits, dyspepsia, ulcer disease, hematochezia, or melena.     Endocrine: Negative for cold " intolerance, heat intolerance, polydipsia, polyphagia and polyuria.     Genitourinary: Negative.      Musculoskeletal: DJD.  History of recent meniscal surgery left knee    Neurological:  Denies any history of recurrent headaches, strokes, TIA, or seizure disorder.     Hematological: Denies any food allergies, seasonal allergies, bleeding disorders, or lymphadenopathy.     Psychiatric/Behavioral: Denies any history of depression, substance abuse, or change in cognitive function.         Physical Exam : The following systems were reassessed and no changes noted    Constitutional: Cooperative, alert and oriented,  in no acute distress.     HENT:   Head: Normocephalic, normal hair patterns, no masses or tenderness.  Ears, Nose, and Throat: No gross abnormalities. No pallor or cyanosis.   Eyes: EOMS intact, PERRL, conjunctivae and lids unremarkable. Fundoscopic exam and visual fields not performed.   Neck: No palpable masses or adenopathy, no thyromegaly, no JVD, carotid pulses are full and equal bilaterally and without  Bruits.     Cardiovascular: Regular rhythm, S1 and S2 normal, no S3 or S4.  No murmurs, gallops, or rubs detected.     Pulmonary/Chest: Chest: normal symmetry, no tenderness to palpation, normal respiratory excursion, no intercostal retraction, no use of accessory muscles.            Pulmonary: Normal breath sounds. No rales or ronchi.    Abdominal: Abdomen soft, bowel sounds normoactive, no masses, no hepatosplenomegaly, non-tender, no bruits.     Musculoskeletal: No deformities, clubbing, cyanosis, erythema, or edema observed.     Neurological: No gross motor or sensory deficits noted, affect appropriate, oriented to time, person, place.     Skin: Warm and dry to the touch, no apparent skin lesions or masses noted.     Psychiatric: He has a normal mood and affect. His behavior is normal. Judgment and thought content normal.         Procedures      Lab Results   Component Value Date    WBC 6.79  08/22/2017    HGB 14.9 08/22/2017    HCT 43.0 08/22/2017    MCV 92.9 08/22/2017     08/22/2017     Lab Results   Component Value Date    GLUCOSE 137 (H) 08/22/2017    BUN 17 08/22/2017    CREATININE 0.85 08/22/2017    EGFRIFNONA 90 08/22/2017    BCR 20.0 08/22/2017    CO2 21.0 (L) 08/22/2017    CALCIUM 9.1 08/22/2017    ALBUMIN 4.30 08/22/2017    AST 25 08/22/2017    ALT 24 08/22/2017     No results found for: CHOL  No results found for: TRIG  No results found for: HDL  No components found for: LDLCALC  No results found for: LDL  No results found for: HDLLDLRATIO  No components found for: CHOLHDL  No results found for: HGBA1C  No results found for: TSH, [id number], [id number], THYROIDAB        ASSESSMENT AND PLAN  Mr. Tellez is stable with no clinical evidence of angina or congestive heart failure.  He is 16 years post coronary artery bypass surgery.    His lipid profiles were in the normal range when checked in November 2020.  He follows up with Dr. Ansari on a regular basis.   I have continued antiplatelet therapy with aspirin and Plavix, antianginal therapy with isosorbide mononitrate and Ranexa and antihypertensive therapy with lisinopril HCTZ.  If his blood pressure is consistently elevated we could add amlodipine 5 mg in p.m.  I have advised him to keep a close watch on his blood pressure.    Diagnoses and all orders for this visit:    1. Presence of aortocoronary bypass graft (Primary)    2. Atherosclerosis of native coronary artery of native heart without angina pectoris    3. Essential hypertension        Patient's Body mass index is 31.82 kg/m². BMI is above normal parameters. Recommendations include: exercise counseling and nutrition counseling.  Patient is a non-smoker    Pradeep Francis MD  7/16/2021  09:46 CDT

## 2021-08-16 RX ORDER — ISOSORBIDE MONONITRATE 30 MG/1
TABLET, EXTENDED RELEASE ORAL
Qty: 90 TABLET | Refills: 3 | Status: SHIPPED | OUTPATIENT
Start: 2021-08-16 | End: 2022-08-10 | Stop reason: SDUPTHER

## 2021-10-16 ENCOUNTER — FLU SHOT (OUTPATIENT)
Dept: FAMILY MEDICINE CLINIC | Facility: CLINIC | Age: 71
End: 2021-10-16

## 2021-10-16 DIAGNOSIS — Z23 NEED FOR INFLUENZA VACCINATION: Primary | ICD-10-CM

## 2021-10-16 PROCEDURE — 90662 IIV NO PRSV INCREASED AG IM: CPT | Performed by: GENERAL PRACTICE

## 2021-10-16 PROCEDURE — G0008 ADMIN INFLUENZA VIRUS VAC: HCPCS | Performed by: GENERAL PRACTICE

## 2021-10-25 RX ORDER — RANOLAZINE 500 MG/1
TABLET, EXTENDED RELEASE ORAL
Qty: 180 TABLET | Refills: 3 | Status: SHIPPED | OUTPATIENT
Start: 2021-10-25 | End: 2022-10-24

## 2022-01-28 ENCOUNTER — OFFICE VISIT (OUTPATIENT)
Dept: CARDIOLOGY | Facility: CLINIC | Age: 72
End: 2022-01-28

## 2022-01-28 VITALS
WEIGHT: 219 LBS | TEMPERATURE: 96.4 F | HEIGHT: 69 IN | HEART RATE: 54 BPM | SYSTOLIC BLOOD PRESSURE: 132 MMHG | DIASTOLIC BLOOD PRESSURE: 76 MMHG | OXYGEN SATURATION: 99 % | BODY MASS INDEX: 32.44 KG/M2

## 2022-01-28 DIAGNOSIS — R06.02 SOB (SHORTNESS OF BREATH): ICD-10-CM

## 2022-01-28 DIAGNOSIS — R07.2 PRECORDIAL PAIN: ICD-10-CM

## 2022-01-28 DIAGNOSIS — E78.2 MIXED HYPERLIPIDEMIA: ICD-10-CM

## 2022-01-28 DIAGNOSIS — I25.2 OLD MI (MYOCARDIAL INFARCTION): ICD-10-CM

## 2022-01-28 DIAGNOSIS — I10 ESSENTIAL HYPERTENSION: ICD-10-CM

## 2022-01-28 DIAGNOSIS — Z95.1 PRESENCE OF AORTOCORONARY BYPASS GRAFT: Primary | ICD-10-CM

## 2022-01-28 LAB
QT INTERVAL: 430 MS
QTC INTERVAL: 407 MS

## 2022-01-28 PROCEDURE — 93000 ELECTROCARDIOGRAM COMPLETE: CPT | Performed by: INTERNAL MEDICINE

## 2022-01-28 PROCEDURE — 99214 OFFICE O/P EST MOD 30 MIN: CPT | Performed by: INTERNAL MEDICINE

## 2022-01-28 NOTE — PROGRESS NOTES
Storm Tellez  71 y.o. male    1. Presence of aortocoronary bypass graft    2. Mixed hyperlipidemia    3. Essential hypertension    4. Old MI (myocardial infarction)    5. Precordial pain    6. SOB (shortness of breath)        History of Present Illness:  Mr. Tellez is here for follow-up of his above stated problems.  He is status post coronary artery bypass surgery in 2005.  He had received LIMA to LAD, SVG to distal RCA, SVG to diagonal and free YOUNG to obtuse marginal coronary artery.     The patient has occasional episodes of tingling in the left side of the face and left-sided upper chest discomfort which is not always related to exertion.  It is at times pressure-like in character.  He does have left shoulder discomfort and wondered if it is related to it.  He does have NYHA class II dyspnea on exertion with no PND or orthopnea.  His heart rate was 54 and blood pressure 132/76 and clinical exam did not reveal any tenderness.    EKG today showed sinus bradycardia with baseline artifacts.  Poor R wave progression anteroseptal leads.    He had lab work done in November 2021 by Dr. Orellana and CBC was within normal limits with a hemoglobin of 14.6 and hematocrit of 44.7 and platelet count of 207.  Cholesterol is 134 with an HDL of 46 triglyceride of 80 and LDL of 72.    No Known Allergies      Past Medical History:   Diagnosis Date   • Bradycardia    • Cancer (HCC)     skin   • Coronary atherosclerosis of native coronary artery    • Dizziness    • Hypertension    • Presence of aortocoronary bypass graft    • Sleep apnea    • SOB (shortness of breath)          Past Surgical History:   Procedure Laterality Date   • CARDIAC CATHETERIZATION     • CORONARY ARTERY BYPASS GRAFT     • HERNIA REPAIR     • SHOULDER SURGERY           Family History   Problem Relation Age of Onset   • Heart disease Mother    • Heart disease Father          Social History     Socioeconomic History   • Marital status:    Tobacco Use  "  • Smoking status: Former Smoker   • Smokeless tobacco: Never Used   Substance and Sexual Activity   • Alcohol use: No   • Drug use: No   • Sexual activity: Defer         Current Outpatient Medications   Medication Sig Dispense Refill   • aspirin  MG tablet Take 325 mg by mouth Every 6 (Six) Hours As Needed.     • atorvastatin (LIPITOR) 40 MG tablet TAKE 1 TABLET BY MOUTH DAILY. 90 tablet 3   • clopidogrel (PLAVIX) 75 MG tablet TAKE 1 TABLET BY MOUTH DAILY. 90 tablet 3   • folic acid (FOLVITE) 400 MCG tablet Take 400 mcg by mouth Daily.     • isosorbide mononitrate (IMDUR) 30 MG 24 hr tablet TAKE 1 TABLET BY MOUTH EVERY DAY 90 tablet 3   • lisinopril-hydrochlorothiazide (PRINZIDE,ZESTORETIC) 20-12.5 MG per tablet TAKE 1 TABLET BY MOUTH EVERY DAY 90 tablet 2   • nitroglycerin (NITROSTAT) 0.4 MG SL tablet Place 1 tablet under the tongue Every 5 (Five) Minutes As Needed for Chest Pain. Take no more than 3 doses in 15 minutes. 30 tablet 6   • ranolazine (RANEXA) 500 MG 12 hr tablet TAKE 1 TABLET BY MOUTH EVERY 12 (TWELVE) HOURS. 180 tablet 3     No current facility-administered medications for this visit.         OBJECTIVE    /76 (BP Location: Left arm, Patient Position: Sitting, Cuff Size: Adult)   Pulse 54   Temp 96.4 °F (35.8 °C)   Ht 175.3 cm (69.02\")   Wt 99.3 kg (219 lb)   SpO2 99%   BMI 32.32 kg/m²         Review of Systems : The following systems were reviewed and changes noted as indicated under history of present illness and below    Constitutional:  Denies recent weight loss, weight gain, fever or chills, no change in exercise tolerance     HENT:  Denies any hearing loss, epistaxis, hoarseness, or difficulty speaking.     Eyes: Wears eyeglasses or contact lenses     Respiratory:  Dyspnea with exertion,no cough, wheezing, or hemoptysis. History of sleep apnea    Cardiovascular: See HPI    Gastrointestinal:  Denies change in bowel habits, dyspepsia, ulcer disease, hematochezia, or melena. "     Endocrine: Negative for cold intolerance, heat intolerance, polydipsia, polyphagia and polyuria.     Genitourinary: Negative.      Musculoskeletal: DJD.  History of recent meniscal surgery left knee    Neurological:  Denies any history of recurrent headaches, strokes, TIA, or seizure disorder.     Hematological: Denies any food allergies, seasonal allergies, bleeding disorders, or lymphadenopathy.     Psychiatric/Behavioral: Denies any history of depression, substance abuse, or change in cognitive function.         Physical Exam : The following systems were reassessed and no changes noted    Constitutional: Cooperative, alert and oriented,  in no acute distress.     HENT:   Head: Normocephalic, normal hair patterns, no masses or tenderness.  Ears, Nose, and Throat: No gross abnormalities. No pallor or cyanosis.   Eyes: EOMS intact, PERRL, conjunctivae and lids unremarkable. Fundoscopic exam and visual fields not performed.   Neck: No palpable masses or adenopathy, no thyromegaly, no JVD, carotid pulses are full and equal bilaterally and without  Bruits.     Cardiovascular: Regular rhythm, S1 and S2 normal, no S3 or S4.  No murmurs, gallops, or rubs detected.     Pulmonary/Chest: Chest: normal symmetry, no tenderness to palpation, normal respiratory excursion, no intercostal retraction, no use of accessory muscles.            Pulmonary: Normal breath sounds. No rales or ronchi.    Abdominal: Abdomen soft, bowel sounds normoactive, no masses, no hepatosplenomegaly, non-tender, no bruits.     Musculoskeletal: No deformities, clubbing, cyanosis, erythema, or edema observed.     Neurological: No gross motor or sensory deficits noted, affect appropriate, oriented to time, person, place.     Skin: Warm and dry to the touch, no apparent skin lesions or masses noted.     Psychiatric: He has a normal mood and affect. His behavior is normal. Judgment and thought content normal.         Procedures      Lab Results    Component Value Date    WBC 6.79 08/22/2017    HGB 14.9 08/22/2017    HCT 43.0 08/22/2017    MCV 92.9 08/22/2017     08/22/2017     Lab Results   Component Value Date    GLUCOSE 137 (H) 08/22/2017    BUN 17 08/22/2017    CREATININE 0.85 08/22/2017    EGFRIFNONA 90 08/22/2017    BCR 20.0 08/22/2017    CO2 21.0 (L) 08/22/2017    CALCIUM 9.1 08/22/2017    ALBUMIN 4.30 08/22/2017    AST 25 08/22/2017    ALT 24 08/22/2017     No results found for: CHOL  No results found for: TRIG  No results found for: HDL  No components found for: LDLCALC  No results found for: LDL  No results found for: HDLLDLRATIO  No components found for: CHOLHDL  No results found for: HGBA1C  No results found for: TSH, J4ZEEGN, E1TPTTR, THYROIDAB        ASSESSMENT AND PLAN  Mr. Tellez has intermittent episodes of left-sided chest discomfort and he is about 16 years post CABG.  I believe that it will be prudent to proceed with a Lexiscan Cardiolite stress test to further risk stratify him, and to rule out progression of coronary artery disease.    An echocardiogram to assess left ventricular and valvular function has been arranged.      I have continued antiplatelet therapy with aspirin and Plavix, antianginal therapy with isosorbide mononitrate and Ranexa and antihypertensive therapy with lisinopril HCTZ.   Further recommendations will follow.    Diagnoses and all orders for this visit:    1. Presence of aortocoronary bypass graft (Primary)  -     Stress Test With Myocardial Perfusion One Day; Future  -     Adult Transthoracic Echo Complete w/ Color, Spectral and Contrast if Necessary Per Protocol; Future    2. Mixed hyperlipidemia  -     Stress Test With Myocardial Perfusion One Day; Future  -     Adult Transthoracic Echo Complete w/ Color, Spectral and Contrast if Necessary Per Protocol; Future    3. Essential hypertension  -     ECG 12 Lead  -     Stress Test With Myocardial Perfusion One Day; Future  -     Adult Transthoracic Echo  Complete w/ Color, Spectral and Contrast if Necessary Per Protocol; Future    4. Old MI (myocardial infarction)  -     Stress Test With Myocardial Perfusion One Day; Future  -     Adult Transthoracic Echo Complete w/ Color, Spectral and Contrast if Necessary Per Protocol; Future    5. Precordial pain  -     Stress Test With Myocardial Perfusion One Day; Future  -     Adult Transthoracic Echo Complete w/ Color, Spectral and Contrast if Necessary Per Protocol; Future    6. SOB (shortness of breath)  -     Stress Test With Myocardial Perfusion One Day; Future  -     Adult Transthoracic Echo Complete w/ Color, Spectral and Contrast if Necessary Per Protocol; Future        Patient's Body mass index is 32.32 kg/m². BMI is above normal parameters. Recommendations include: exercise counseling and nutrition counseling.  Patient is a non-smoker    Pradeep Francis MD  1/28/2022  10:01 CST

## 2022-01-31 ENCOUNTER — HOSPITAL ENCOUNTER (OUTPATIENT)
Dept: CARDIOLOGY | Facility: HOSPITAL | Age: 72
Discharge: HOME | End: 2022-01-31

## 2022-01-31 ENCOUNTER — HOSPITAL ENCOUNTER (OUTPATIENT)
Dept: NUCLEAR MEDICINE | Facility: HOSPITAL | Age: 72
Discharge: HOME | End: 2022-01-31

## 2022-01-31 ENCOUNTER — APPOINTMENT (OUTPATIENT)
Dept: NUCLEAR MEDICINE | Facility: HOSPITAL | Age: 72
End: 2022-01-31

## 2022-01-31 DIAGNOSIS — I25.2 OLD MI (MYOCARDIAL INFARCTION): ICD-10-CM

## 2022-01-31 DIAGNOSIS — E78.2 MIXED HYPERLIPIDEMIA: ICD-10-CM

## 2022-01-31 DIAGNOSIS — Z95.1 PRESENCE OF AORTOCORONARY BYPASS GRAFT: ICD-10-CM

## 2022-01-31 DIAGNOSIS — I10 ESSENTIAL HYPERTENSION: ICD-10-CM

## 2022-01-31 DIAGNOSIS — R07.2 PRECORDIAL PAIN: ICD-10-CM

## 2022-01-31 DIAGNOSIS — R06.02 SOB (SHORTNESS OF BREATH): ICD-10-CM

## 2022-01-31 PROCEDURE — 93016 CV STRESS TEST SUPVJ ONLY: CPT | Performed by: INTERNAL MEDICINE

## 2022-01-31 PROCEDURE — 93018 CV STRESS TEST I&R ONLY: CPT | Performed by: INTERNAL MEDICINE

## 2022-01-31 PROCEDURE — 25010000002 REGADENOSON 0.4 MG/5ML SOLUTION: Performed by: INTERNAL MEDICINE

## 2022-01-31 PROCEDURE — A9500 TC99M SESTAMIBI: HCPCS | Performed by: INTERNAL MEDICINE

## 2022-01-31 PROCEDURE — 93017 CV STRESS TEST TRACING ONLY: CPT

## 2022-01-31 PROCEDURE — 0 TECHNETIUM SESTAMIBI: Performed by: INTERNAL MEDICINE

## 2022-01-31 PROCEDURE — 78452 HT MUSCLE IMAGE SPECT MULT: CPT | Performed by: INTERNAL MEDICINE

## 2022-01-31 PROCEDURE — 78452 HT MUSCLE IMAGE SPECT MULT: CPT

## 2022-01-31 RX ORDER — SODIUM CHLORIDE 0.9 % (FLUSH) 0.9 %
10 SYRINGE (ML) INJECTION ONCE
Status: DISCONTINUED | OUTPATIENT
Start: 2022-01-31 | End: 2022-02-01 | Stop reason: HOSPADM

## 2022-01-31 RX ADMIN — TECHNETIUM TC 99M SESTAMIBI 1 DOSE: 1 INJECTION INTRAVENOUS at 08:19

## 2022-01-31 RX ADMIN — REGADENOSON 0.4 MG: 0.08 INJECTION, SOLUTION INTRAVENOUS at 09:34

## 2022-02-01 ENCOUNTER — HOSPITAL ENCOUNTER (OUTPATIENT)
Dept: CARDIOLOGY | Facility: HOSPITAL | Age: 72
Discharge: HOME | End: 2022-02-01

## 2022-02-01 ENCOUNTER — HOSPITAL ENCOUNTER (OUTPATIENT)
Dept: NUCLEAR MEDICINE | Facility: HOSPITAL | Age: 72
Discharge: HOME | End: 2022-02-01

## 2022-02-01 LAB
BH CV REST NUCLEAR ISOTOPE DOSE: 11 MCI
BH CV STRESS BP STAGE 1: NORMAL
BH CV STRESS COMMENTS STAGE 1: NORMAL
BH CV STRESS DOSE REGADENOSON STAGE 1: 0.4
BH CV STRESS DURATION MIN STAGE 1: 0
BH CV STRESS DURATION SEC STAGE 1: 10
BH CV STRESS HR STAGE 1: 55
BH CV STRESS NUCLEAR ISOTOPE DOSE: 35.4 MCI
BH CV STRESS PROTOCOL 1: NORMAL
BH CV STRESS PROTOCOL 2 BP STAGE 1: NORMAL
BH CV STRESS PROTOCOL 2 COMMENTS STAGE 1: NORMAL
BH CV STRESS PROTOCOL 2 DOSE REGADENOSON STAGE 1: 0.4
BH CV STRESS PROTOCOL 2 DURATION MIN STAGE 1: 0
BH CV STRESS PROTOCOL 2 DURATION SEC STAGE 1: 10
BH CV STRESS PROTOCOL 2 HR STAGE 1: 61
BH CV STRESS PROTOCOL 2 STAGE 1: 1
BH CV STRESS PROTOCOL 2: NORMAL
BH CV STRESS RECOVERY BP: NORMAL MMHG
BH CV STRESS RECOVERY HR: 81 BPM
BH CV STRESS STAGE 1: 1
LV EF NUC BP: 64 %
MAXIMAL PREDICTED HEART RATE: 148 BPM
PERCENT MAX PREDICTED HR: 60.81 %
STRESS BASELINE BP: NORMAL MMHG
STRESS BASELINE HR: 54 BPM
STRESS PERCENT HR: 72 %
STRESS POST ESTIMATED WORKLOAD: 1 METS
STRESS POST PEAK BP: NORMAL MMHG
STRESS POST PEAK HR: 90 BPM
STRESS TARGET HR: 126 BPM

## 2022-02-01 PROCEDURE — 25010000002 REGADENOSON 0.4 MG/5ML SOLUTION: Performed by: INTERNAL MEDICINE

## 2022-02-01 PROCEDURE — 0 TECHNETIUM SESTAMIBI: Performed by: INTERNAL MEDICINE

## 2022-02-01 PROCEDURE — A9500 TC99M SESTAMIBI: HCPCS | Performed by: INTERNAL MEDICINE

## 2022-02-01 RX ORDER — SODIUM CHLORIDE 0.9 % (FLUSH) 0.9 %
10 SYRINGE (ML) INJECTION ONCE
Status: COMPLETED | OUTPATIENT
Start: 2022-02-01 | End: 2022-02-01

## 2022-02-01 RX ADMIN — SODIUM CHLORIDE, PRESERVATIVE FREE 10 ML: 5 INJECTION INTRAVENOUS at 08:04

## 2022-02-01 RX ADMIN — REGADENOSON 0.4 MG: 0.08 INJECTION, SOLUTION INTRAVENOUS at 08:04

## 2022-02-01 RX ADMIN — TECHNETIUM TC 99M SESTAMIBI 1 DOSE: 1 INJECTION INTRAVENOUS at 08:05

## 2022-02-07 ENCOUNTER — TELEPHONE (OUTPATIENT)
Dept: CARDIOLOGY | Facility: CLINIC | Age: 72
End: 2022-02-07

## 2022-02-07 NOTE — TELEPHONE ENCOUNTER
Patient called back I advised him of his results and he said at this time he isn't having any symptom, feeling pretty good. I advised if it does to call the office to let us know. He didn't want to increase his medication at this time.

## 2022-02-07 NOTE — TELEPHONE ENCOUNTER
Normal LV systolic function.  Stress test did not show any definite area of poor blood flow.  Increase the dose of isosorbide mononitrate to 60 mg daily.  If he continues to have symptoms, he should let us know.   Per Dr Carlton  Called pt no answer left vm to call office

## 2022-03-14 RX ORDER — LISINOPRIL AND HYDROCHLOROTHIAZIDE 20; 12.5 MG/1; MG/1
TABLET ORAL
Qty: 90 TABLET | Refills: 2 | Status: SHIPPED | OUTPATIENT
Start: 2022-03-14 | End: 2022-12-27 | Stop reason: SDUPTHER

## 2022-06-23 ENCOUNTER — OFFICE VISIT (OUTPATIENT)
Dept: GASTROENTEROLOGY | Facility: CLINIC | Age: 72
End: 2022-06-23

## 2022-06-23 VITALS
HEART RATE: 56 BPM | BODY MASS INDEX: 33.65 KG/M2 | WEIGHT: 214.4 LBS | DIASTOLIC BLOOD PRESSURE: 82 MMHG | SYSTOLIC BLOOD PRESSURE: 172 MMHG | HEIGHT: 67 IN

## 2022-06-23 DIAGNOSIS — Z12.11 ENCOUNTER FOR SCREENING FOR MALIGNANT NEOPLASM OF COLON: Primary | ICD-10-CM

## 2022-06-23 DIAGNOSIS — Z79.01 ANTICOAGULANT LONG-TERM USE: ICD-10-CM

## 2022-06-23 PROCEDURE — 99203 OFFICE O/P NEW LOW 30 MIN: CPT | Performed by: INTERNAL MEDICINE

## 2022-06-23 RX ORDER — DEXTROSE AND SODIUM CHLORIDE 5; .45 G/100ML; G/100ML
30 INJECTION, SOLUTION INTRAVENOUS CONTINUOUS PRN
Status: CANCELLED | OUTPATIENT
Start: 2022-08-01

## 2022-06-23 NOTE — PROGRESS NOTES
[facility]      Chief Complaint:   Chief Complaint   Patient presents with   • Colonoscopy Consultation       Subjective     HPI:   Patient for screening colonoscopy.  Patient's last colonoscopy was in 2011.  Patient is on anticoagulation and currently taking Plavix and aspirin.  Discussed with patient holding Plavix and aspirin for 3 days prior to the procedure.    Plan; we will schedule patient for screening colonoscopy    Past Medical History:   Past Medical History:   Diagnosis Date   • Bradycardia    • Cancer (HCC)     skin   • Coronary atherosclerosis of native coronary artery    • Dizziness    • Hypertension    • Presence of aortocoronary bypass graft    • Sleep apnea    • SOB (shortness of breath)        Past Surgical History:  Past Surgical History:   Procedure Laterality Date   • CARDIAC CATHETERIZATION     • COLONOSCOPY  05/23/2011    Per Rogelio Rogers M.D.   • CORONARY ARTERY BYPASS GRAFT     • HERNIA REPAIR     • SHOULDER SURGERY         Family History:  Family History   Problem Relation Age of Onset   • Heart disease Mother    • Heart disease Father        Social History:   reports that he has quit smoking. He has never used smokeless tobacco. He reports that he does not drink alcohol and does not use drugs.    Medications:   Prior to Admission medications    Medication Sig Start Date End Date Taking? Authorizing Provider   aspirin  MG tablet Take 325 mg by mouth Every 6 (Six) Hours As Needed.   Yes Robel Ramírez MD   atorvastatin (LIPITOR) 40 MG tablet TAKE 1 TABLET BY MOUTH DAILY. 6/29/21  Yes Pradeep Francis MD   clopidogrel (PLAVIX) 75 MG tablet TAKE 1 TABLET BY MOUTH DAILY. 6/29/21  Yes Pradeep Francis MD   folic acid (FOLVITE) 400 MCG tablet Take 400 mcg by mouth Daily.   Yes Robel Ramírez MD   isosorbide mononitrate (IMDUR) 30 MG 24 hr tablet TAKE 1 TABLET BY MOUTH EVERY DAY 8/16/21  Yes Pradeep Francis MD  "  lisinopril-hydrochlorothiazide (PRINZIDE,ZESTORETIC) 20-12.5 MG per tablet TAKE 1 TABLET BY MOUTH EVERY DAY 3/14/22  Yes Pradeep Francis MD   nitroglycerin (NITROSTAT) 0.4 MG SL tablet Place 1 tablet under the tongue Every 5 (Five) Minutes As Needed for Chest Pain. Take no more than 3 doses in 15 minutes. 4/17/18  Yes Pradeep Francis MD   ranolazine (RANEXA) 500 MG 12 hr tablet TAKE 1 TABLET BY MOUTH EVERY 12 (TWELVE) HOURS. 10/25/21  Yes Pradeep Francis MD       Allergies:  Patient has no known allergies.    ROS:    Review of Systems   Constitutional: Negative for activity change, appetite change and unexpected weight change.   HENT: Negative for congestion, sore throat and trouble swallowing.    Respiratory: Negative for cough, choking and shortness of breath.    Cardiovascular: Negative for chest pain.   Gastrointestinal: Negative for abdominal distention, abdominal pain, anal bleeding, blood in stool, constipation, diarrhea, nausea, rectal pain and vomiting.   Endocrine: Negative for heat intolerance, polydipsia and polyphagia.   Genitourinary: Negative for difficulty urinating.   Musculoskeletal: Negative for arthralgias.   Skin: Negative for color change, pallor, rash and wound.   Allergic/Immunologic: Negative for food allergies.   Neurological: Negative for dizziness, syncope, weakness and headaches.   Psychiatric/Behavioral: Negative for agitation, behavioral problems, confusion and decreased concentration.     Objective     Blood pressure 172/82, pulse 56, height 170.8 cm (67.25\"), weight 97.3 kg (214 lb 6.4 oz).    Physical Exam  Constitutional:       General: He is not in acute distress.     Appearance: He is well-developed. He is not diaphoretic.   HENT:      Head: Normocephalic and atraumatic.   Cardiovascular:      Rate and Rhythm: Normal rate and regular rhythm.      Heart sounds: Normal heart sounds. No murmur heard.    No friction rub. No gallop.   Pulmonary:      " Effort: No respiratory distress.      Breath sounds: Normal breath sounds. No wheezing or rales.   Chest:      Chest wall: No tenderness.   Abdominal:      General: Bowel sounds are normal. There is no distension.      Palpations: Abdomen is soft. There is no mass.      Tenderness: There is no abdominal tenderness. There is no guarding or rebound.      Hernia: No hernia is present.   Musculoskeletal:         General: Normal range of motion.   Skin:     General: Skin is warm and dry.      Coloration: Skin is not pale.      Findings: No erythema or rash.   Neurological:      Mental Status: He is alert and oriented to person, place, and time.   Psychiatric:         Behavior: Behavior normal.         Thought Content: Thought content normal.         Judgment: Judgment normal.          Assessment & Plan   Diagnoses and all orders for this visit:    1. Encounter for screening for malignant neoplasm of colon (Primary)  -     Case Request; Standing  -     Case Request    2. Anticoagulant long-term use  -     Case Request; Standing  -     Case Request    Other orders  -     Follow Anesthesia Guidelines / Standing Orders; Future  -     Obtain Informed Consent; Future        COLONOSCOPY (N/A)     Diagnosis Plan   1. Encounter for screening for malignant neoplasm of colon  Case Request    Case Request   2. Anticoagulant long-term use  Case Request    Case Request       Anticipated Surgical Procedure:  Orders Placed This Encounter   Procedures   • Follow Anesthesia Guidelines / Standing Orders     Standing Status:   Future   • Obtain Informed Consent     Standing Status:   Future     Order Specific Question:   Informed Consent Given For     Answer:   colonoscopy       The risks, benefits, and alternatives of this procedure have been discussed with the patient or the responsible party- the patient understands and agrees to proceed.

## 2022-07-11 RX ORDER — ATORVASTATIN CALCIUM 40 MG/1
40 TABLET, FILM COATED ORAL DAILY
Qty: 90 TABLET | Refills: 3 | Status: SHIPPED | OUTPATIENT
Start: 2022-07-11

## 2022-07-11 RX ORDER — CLOPIDOGREL BISULFATE 75 MG/1
75 TABLET ORAL DAILY
Qty: 90 TABLET | Refills: 3 | Status: SHIPPED | OUTPATIENT
Start: 2022-07-11

## 2022-07-28 RX ORDER — SODIUM, POTASSIUM,MAG SULFATES 17.5-3.13G
1 SOLUTION, RECONSTITUTED, ORAL ORAL EVERY 12 HOURS
Qty: 354 ML | Refills: 0 | Status: SHIPPED | OUTPATIENT
Start: 2022-07-28 | End: 2022-07-28 | Stop reason: ALTCHOICE

## 2022-08-01 ENCOUNTER — HOSPITAL ENCOUNTER (OUTPATIENT)
Facility: HOSPITAL | Age: 72
Setting detail: HOSPITAL OUTPATIENT SURGERY
Discharge: HOME | End: 2022-08-01
Attending: INTERNAL MEDICINE | Admitting: INTERNAL MEDICINE

## 2022-08-01 ENCOUNTER — ANESTHESIA EVENT (OUTPATIENT)
Dept: GASTROENTEROLOGY | Facility: HOSPITAL | Age: 72
End: 2022-08-01

## 2022-08-01 ENCOUNTER — ANESTHESIA (OUTPATIENT)
Dept: GASTROENTEROLOGY | Facility: HOSPITAL | Age: 72
End: 2022-08-01

## 2022-08-01 VITALS
WEIGHT: 205 LBS | HEIGHT: 70 IN | DIASTOLIC BLOOD PRESSURE: 79 MMHG | OXYGEN SATURATION: 95 % | BODY MASS INDEX: 29.35 KG/M2 | RESPIRATION RATE: 18 BRPM | TEMPERATURE: 98.2 F | HEART RATE: 81 BPM | SYSTOLIC BLOOD PRESSURE: 116 MMHG

## 2022-08-01 DIAGNOSIS — Z79.01 ANTICOAGULANT LONG-TERM USE: ICD-10-CM

## 2022-08-01 DIAGNOSIS — Z12.11 ENCOUNTER FOR SCREENING FOR MALIGNANT NEOPLASM OF COLON: ICD-10-CM

## 2022-08-01 PROCEDURE — G0121 COLON CA SCRN NOT HI RSK IND: HCPCS | Performed by: INTERNAL MEDICINE

## 2022-08-01 PROCEDURE — 25010000002 PROPOFOL 10 MG/ML EMULSION: Performed by: NURSE ANESTHETIST, CERTIFIED REGISTERED

## 2022-08-01 RX ORDER — LIDOCAINE HYDROCHLORIDE 20 MG/ML
INJECTION, SOLUTION INTRAVENOUS AS NEEDED
Status: DISCONTINUED | OUTPATIENT
Start: 2022-08-01 | End: 2022-08-01 | Stop reason: SURG

## 2022-08-01 RX ORDER — PROPOFOL 10 MG/ML
VIAL (ML) INTRAVENOUS AS NEEDED
Status: DISCONTINUED | OUTPATIENT
Start: 2022-08-01 | End: 2022-08-01 | Stop reason: SURG

## 2022-08-01 RX ORDER — DEXTROSE AND SODIUM CHLORIDE 5; .45 G/100ML; G/100ML
30 INJECTION, SOLUTION INTRAVENOUS CONTINUOUS PRN
Status: DISCONTINUED | OUTPATIENT
Start: 2022-08-01 | End: 2022-08-01 | Stop reason: HOSPADM

## 2022-08-01 RX ADMIN — LIDOCAINE HYDROCHLORIDE 50 MG: 20 INJECTION, SOLUTION INTRAVENOUS at 15:12

## 2022-08-01 RX ADMIN — PROPOFOL 50 MG: 10 INJECTION, EMULSION INTRAVENOUS at 15:18

## 2022-08-01 RX ADMIN — PROPOFOL 50 MG: 10 INJECTION, EMULSION INTRAVENOUS at 15:13

## 2022-08-01 RX ADMIN — PROPOFOL 50 MG: 10 INJECTION, EMULSION INTRAVENOUS at 15:15

## 2022-08-01 RX ADMIN — PROPOFOL 100 MG: 10 INJECTION, EMULSION INTRAVENOUS at 15:11

## 2022-08-01 RX ADMIN — DEXTROSE AND SODIUM CHLORIDE 30 ML/HR: 5; 450 INJECTION, SOLUTION INTRAVENOUS at 14:24

## 2022-08-01 NOTE — ANESTHESIA POSTPROCEDURE EVALUATION
Patient: Storm Tellez    Procedure Summary     Date: 08/01/22 Room / Location: [facility] ENDOSCOPY 1 / [facility] ENDOSCOPY    Anesthesia Start: 1508 Anesthesia Stop: 1523    Procedure: COLONOSCOPY (N/A ) Diagnosis:       Encounter for screening for malignant neoplasm of colon      Anticoagulant long-term use      (Encounter for screening for malignant neoplasm of colon [Z12.11])      (Anticoagulant long-term use [Z79.01])    Surgeons: Otoniel Kenney MD Provider: Kendell Woodruff CRNA    Anesthesia Type: MAC ASA Status: 3          Anesthesia Type: MAC    Vitals  No vitals data found for the desired time range.          Post Anesthesia Care and Evaluation    Patient location during evaluation: bedside  Patient participation: complete - patient participated  Level of consciousness: sleepy but conscious  Pain score: 0  Pain management: adequate    Airway patency: patent  Anesthetic complications: No anesthetic complications  PONV Status: none  Cardiovascular status: acceptable  Respiratory status: acceptable  Hydration status: acceptable    Comments: ---------------------------               08/01/22                      1524        ---------------------------   BP:          122/64         Pulse:         66           Resp:          20           Temp:   98.2 °F (36.8 °C)   SpO2:          97%         ---------------------------

## 2022-08-01 NOTE — H&P
[facility] operated by [facility]      Chief Complaint:   No chief complaint on file.  I agree with the current note with no changes in the history.  Subjective     HPI:   Patient for screening colonoscopy.  Patient's last colonoscopy was in 2011.  Patient is on anticoagulation and currently taking Plavix and aspirin.  Discussed with patient holding Plavix and aspirin for 3 days prior to the procedure.    Plan; we will schedule patient for screening colonoscopy    Past Medical History:   Past Medical History:   Diagnosis Date   • Bradycardia    • Cancer (HCC)     skin   • Coronary atherosclerosis of native coronary artery    • Dizziness    • Elevated cholesterol    • Hypertension    • Presence of aortocoronary bypass graft    • Sleep apnea    • SOB (shortness of breath)        Past Surgical History:    Past Surgical History:   Procedure Laterality Date   • CARDIAC CATHETERIZATION     • COLONOSCOPY  05/23/2011    Per Rogelio Rogers M.D.   • CORONARY ARTERY BYPASS GRAFT     • HERNIA REPAIR     • SHOULDER SURGERY         Family History:  Family History   Problem Relation Age of Onset   • Heart disease Mother    • Heart disease Father    • Hypertension Other        Social History:   reports that he has quit smoking. He has never used smokeless tobacco. He reports that he does not drink alcohol and does not use drugs.    Medications:   Prior to Admission medications    Medication Sig Start Date End Date Taking? Authorizing Provider   aspirin  MG tablet Take 325 mg by mouth Every 6 (Six) Hours As Needed.   Yes Robel Ramírez MD   atorvastatin (LIPITOR) 40 MG tablet TAKE 1 TABLET BY MOUTH DAILY. 6/29/21  Yes Pradeep Francis MD   clopidogrel (PLAVIX) 75 MG tablet TAKE 1 TABLET BY MOUTH DAILY. 6/29/21  Yes Pradeep Francis MD   folic acid (FOLVITE) 400 MCG tablet Take 400 mcg by mouth Daily.   Yes Robel Ramírez MD   isosorbide mononitrate (IMDUR) 30 MG 24 hr tablet TAKE 1  "TABLET BY MOUTH EVERY DAY 8/16/21  Yes Pradeep Francis MD   lisinopril-hydrochlorothiazide (PRINZIDE,ZESTORETIC) 20-12.5 MG per tablet TAKE 1 TABLET BY MOUTH EVERY DAY 3/14/22  Yes Pradeep Francis MD   nitroglycerin (NITROSTAT) 0.4 MG SL tablet Place 1 tablet under the tongue Every 5 (Five) Minutes As Needed for Chest Pain. Take no more than 3 doses in 15 minutes. 4/17/18  Yes Pradeep Francis MD   ranolazine (RANEXA) 500 MG 12 hr tablet TAKE 1 TABLET BY MOUTH EVERY 12 (TWELVE) HOURS. 10/25/21  Yes Pradeep Francis MD       Allergies:  Patient has no known allergies.    ROS:    Review of Systems   Constitutional: Negative for activity change, appetite change and unexpected weight change.   HENT: Negative for congestion, sore throat and trouble swallowing.    Respiratory: Negative for cough, choking and shortness of breath.    Cardiovascular: Negative for chest pain.   Gastrointestinal: Negative for abdominal distention, abdominal pain, anal bleeding, blood in stool, constipation, diarrhea, nausea, rectal pain and vomiting.   Endocrine: Negative for heat intolerance, polydipsia and polyphagia.   Genitourinary: Negative for difficulty urinating.   Musculoskeletal: Negative for arthralgias.   Skin: Negative for color change, pallor, rash and wound.   Allergic/Immunologic: Negative for food allergies.   Neurological: Negative for dizziness, syncope, weakness and headaches.   Psychiatric/Behavioral: Negative for agitation, behavioral problems, confusion and decreased concentration.     Objective     Blood pressure 161/77, pulse 66, temperature 98.2 °F (36.8 °C), resp. rate 20, height 177.8 cm (70\"), weight 93 kg (205 lb), SpO2 97 %.    Physical Exam  Constitutional:       General: He is not in acute distress.     Appearance: He is well-developed. He is not diaphoretic.   HENT:      Head: Normocephalic and atraumatic.   Cardiovascular:      Rate and Rhythm: Normal rate and regular " rhythm.      Heart sounds: Normal heart sounds. No murmur heard.    No friction rub. No gallop.   Pulmonary:      Effort: No respiratory distress.      Breath sounds: Normal breath sounds. No wheezing or rales.   Chest:      Chest wall: No tenderness.   Abdominal:      General: Bowel sounds are normal. There is no distension.      Palpations: Abdomen is soft. There is no mass.      Tenderness: There is no abdominal tenderness. There is no guarding or rebound.      Hernia: No hernia is present.   Musculoskeletal:         General: Normal range of motion.   Skin:     General: Skin is warm and dry.      Coloration: Skin is not pale.      Findings: No erythema or rash.   Neurological:      Mental Status: He is alert and oriented to person, place, and time.   Psychiatric:         Behavior: Behavior normal.         Thought Content: Thought content normal.         Judgment: Judgment normal.          Assessment & Plan   There are no diagnoses linked to this encounter.    COLONOSCOPY (N/A)    No diagnosis found.    Anticipated Surgical Procedure:  No orders of the defined types were placed in this encounter.      The risks, benefits, and alternatives of this procedure have been discussed with the patient or the responsible party- the patient understands and agrees to proceed.

## 2022-08-01 NOTE — ANESTHESIA PREPROCEDURE EVALUATION
Anesthesia Evaluation     Patient summary reviewed and Nursing notes reviewed   NPO Solid Status: > 8 hours  NPO Liquid Status: > 2 hours           Airway   No difficulty expected  Dental      Pulmonary - normal exam   (+) a smoker Former,   Cardiovascular     Rhythm: regular  Rate: normal    (+) CABG,       Neuro/Psych  GI/Hepatic/Renal/Endo - negative ROS     Musculoskeletal (-) negative ROS    Abdominal    Substance History      OB/GYN          Other                        Anesthesia Plan    ASA 3     MAC     intravenous induction     Anesthetic plan, risks, benefits, and alternatives have been provided, discussed and informed consent has been obtained with: patient.    Plan discussed with CRNA.        CODE STATUS:

## 2022-08-10 RX ORDER — ISOSORBIDE MONONITRATE 30 MG/1
30 TABLET, EXTENDED RELEASE ORAL DAILY
Qty: 90 TABLET | Refills: 3 | Status: SHIPPED | OUTPATIENT
Start: 2022-08-10

## 2022-08-29 ENCOUNTER — OFFICE VISIT (OUTPATIENT)
Dept: CARDIOLOGY | Facility: CLINIC | Age: 72
End: 2022-08-29

## 2022-08-29 VITALS
BODY MASS INDEX: 30.35 KG/M2 | TEMPERATURE: 97.1 F | DIASTOLIC BLOOD PRESSURE: 78 MMHG | HEIGHT: 70 IN | HEART RATE: 61 BPM | WEIGHT: 212 LBS | OXYGEN SATURATION: 99 % | SYSTOLIC BLOOD PRESSURE: 148 MMHG

## 2022-08-29 DIAGNOSIS — E78.2 MIXED HYPERLIPIDEMIA: ICD-10-CM

## 2022-08-29 DIAGNOSIS — I25.2 OLD MI (MYOCARDIAL INFARCTION): ICD-10-CM

## 2022-08-29 DIAGNOSIS — I25.10 ATHEROSCLEROSIS OF NATIVE CORONARY ARTERY OF NATIVE HEART WITHOUT ANGINA PECTORIS: Primary | ICD-10-CM

## 2022-08-29 DIAGNOSIS — I10 ESSENTIAL HYPERTENSION: ICD-10-CM

## 2022-08-29 PROCEDURE — 99214 OFFICE O/P EST MOD 30 MIN: CPT | Performed by: INTERNAL MEDICINE

## 2022-08-29 NOTE — PROGRESS NOTES
Storm Tellez  71 y.o. male    1. Atherosclerosis of native coronary artery of native heart without angina pectoris    2. Essential hypertension    3. Mixed hyperlipidemia    4. Old MI (myocardial infarction)        History of Present Illness:  Mr. Tellez is here for follow-up of his above stated problems.  He is status post coronary artery bypass surgery in 2005.  He had received LIMA to LAD, SVG to distal RCA, SVG to diagonal and free YOUNG to obtuse marginal coronary artery.     Echocardiogram in January 2022 showed  · The study is technically difficult for diagnosis. The quality of the study is limited due to patient body habitus.  · Left ventricular wall thickness is consistent with mild concentric hypertrophy.  · Estimated left ventricular EF = 53% Left ventricular ejection fraction appears to be 51 - 55%. Left ventricular systolic function is normal.  · Left ventricular diastolic function is consistent with (grade I) impaired relaxation.  · The right ventricular cavity is moderately dilated.  · The left atrium is moderate-severely dilated  · Estimated right ventricular systolic pressure from tricuspid regurgitation is mildly elevated (35-45 mmHg).    Lexiscan Cardiolite stress test in February 2022 showed:  · Findings consistent with an equivocal ECG stress test.  · Diaphragmatic attenuation artifact is present.  · Left ventricular ejection fraction is normal. (Calculated EF = 64%).  · There was a small to moderate size defect involving the basal inferior and mid inferolateral wall, distal anterior/apex in the nonattenuation corrected images.  · However, post attenuation correction myocardial perfusion was normal in the above-mentioned areas. Based on the findings, diaphragmatic attenuation artifact could not be ruled out.  · Low to intermediate risk study    The patient was progressed well and denied any cardiac symptoms at this time with no chest pain or shortness of breath.  He has been compliant with  his medications.    He had lab work done in November 2021 by Dr. Orellana and CBC was within normal limits with a hemoglobin of 14.6 and hematocrit of 44.7 and platelet count of 207.  Cholesterol is 134 with an HDL of 46 triglyceride of 80 and LDL of 72.    No Known Allergies      Past Medical History:   Diagnosis Date   • Bradycardia    • Cancer (HCC)     skin   • Coronary atherosclerosis of native coronary artery    • Dizziness    • Elevated cholesterol    • Hypertension    • Presence of aortocoronary bypass graft    • Sleep apnea    • SOB (shortness of breath)          Past Surgical History:   Procedure Laterality Date   • CARDIAC CATHETERIZATION     • COLONOSCOPY  05/23/2011    Per Rogelio Rogers M.D.   • COLONOSCOPY N/A 8/1/2022    Procedure: COLONOSCOPY;  Surgeon: Otoniel Kenney MD;  Location: [facility] ENDOSCOPY;  Service: Gastroenterology;  Laterality: N/A;   • CORONARY ARTERY BYPASS GRAFT     • HERNIA REPAIR     • SHOULDER SURGERY           Family History   Problem Relation Age of Onset   • Heart disease Mother    • Heart disease Father    • Hypertension Other          Social History     Socioeconomic History   • Marital status:    Tobacco Use   • Smoking status: Former Smoker   • Smokeless tobacco: Never Used   • Tobacco comment: 06/23/2022 Patient states he utilized 0.5- 1 pack of Cigarettes per day x 10- 15 years 40 years prior.   Substance and Sexual Activity   • Alcohol use: No   • Drug use: No   • Sexual activity: Defer         Current Outpatient Medications   Medication Sig Dispense Refill   • aspirin  MG tablet Take 325 mg by mouth Every 6 (Six) Hours As Needed.     • atorvastatin (LIPITOR) 40 MG tablet Take 1 tablet by mouth Daily. 90 tablet 3   • clopidogrel (PLAVIX) 75 MG tablet Take 1 tablet by mouth Daily. 90 tablet 3   • folic acid (FOLVITE) 400 MCG tablet Take 400 mcg by mouth Daily.     • isosorbide mononitrate (IMDUR) 30 MG 24 hr tablet Take 1 tablet by mouth Daily. 90  "tablet 3   • lisinopril-hydrochlorothiazide (PRINZIDE,ZESTORETIC) 20-12.5 MG per tablet TAKE 1 TABLET BY MOUTH EVERY DAY 90 tablet 2   • nitroglycerin (NITROSTAT) 0.4 MG SL tablet Place 1 tablet under the tongue Every 5 (Five) Minutes As Needed for Chest Pain. Take no more than 3 doses in 15 minutes. 30 tablet 6   • ranolazine (RANEXA) 500 MG 12 hr tablet TAKE 1 TABLET BY MOUTH EVERY 12 (TWELVE) HOURS. 180 tablet 3     No current facility-administered medications for this visit.         OBJECTIVE    /78 (BP Location: Left arm, Patient Position: Sitting, Cuff Size: Adult)   Pulse 61   Temp 97.1 °F (36.2 °C)   Ht 177.8 cm (70\")   Wt 96.2 kg (212 lb)   SpO2 99%   BMI 30.42 kg/m²         Review of Systems : The following systems were reviewed and changes noted as indicated under history of present illness and below    Constitutional:  Denies recent weight loss, weight gain, fever or chills, no change in exercise tolerance     HENT:  Denies any hearing loss, epistaxis, hoarseness, or difficulty speaking.     Eyes: Wears eyeglasses or contact lenses     Respiratory: History of sleep apnea.  No chest pain or shortness of breath    Cardiovascular: No chest pain, palpitation or dyspnea    Gastrointestinal:  Denies change in bowel habits, dyspepsia, ulcer disease, hematochezia, or melena.     Endocrine: Negative for cold intolerance, heat intolerance, polydipsia, polyphagia and polyuria.     Genitourinary: Negative.      Musculoskeletal: DJD.      Neurological:  Denies any history of recurrent headaches, strokes, TIA, or seizure disorder.     Hematological: Denies any food allergies, seasonal allergies, bleeding disorders, or lymphadenopathy.     Psychiatric/Behavioral: Denies any history of depression, substance abuse, or change in cognitive function.         Physical Exam : The following systems were reassessed and no changes noted    Constitutional: Cooperative, alert and oriented,  in no acute distress. "     HENT:   Head: Normocephalic, normal hair patterns, no masses or tenderness.  Ears, Nose, and Throat: No gross abnormalities. No pallor or cyanosis.   Eyes: EOMS intact, PERRL, conjunctivae and lids unremarkable. Fundoscopic exam and visual fields not performed.   Neck: No palpable masses or adenopathy, no thyromegaly, no JVD, carotid pulses are full and equal bilaterally and without  Bruits.     Cardiovascular: Regular rhythm, S1 and S2 normal, no S3 or S4.  No murmurs, gallops, or rubs detected.     Pulmonary/Chest: Chest: normal symmetry, no tenderness to palpation, normal respiratory excursion, no intercostal retraction, no use of accessory muscles.            Pulmonary: Normal breath sounds. No rales or ronchi.    Abdominal: Abdomen soft, bowel sounds normoactive, no masses, no hepatosplenomegaly, non-tender, no bruits.     Musculoskeletal: No deformities, clubbing, cyanosis, erythema, or edema observed.     Neurological: No gross motor or sensory deficits noted, affect appropriate, oriented to time, person, place.     Skin: Warm and dry to the touch, no apparent skin lesions or masses noted.     Psychiatric: He has a normal mood and affect. His behavior is normal. Judgment and thought content normal.         Procedures      Lab Results   Component Value Date    WBC 6.79 08/22/2017    HGB 14.9 08/22/2017    HCT 43.0 08/22/2017    MCV 92.9 08/22/2017     08/22/2017     Lab Results   Component Value Date    GLUCOSE 137 (H) 08/22/2017    BUN 17 08/22/2017    CREATININE 0.85 08/22/2017    EGFRIFNONA 90 08/22/2017    BCR 20.0 08/22/2017    CO2 21.0 (L) 08/22/2017    CALCIUM 9.1 08/22/2017    ALBUMIN 4.30 08/22/2017    AST 25 08/22/2017    ALT 24 08/22/2017     No results found for: CHOL  No results found for: TRIG  No results found for: HDL  No components found for: LDLCALC  No results found for: LDL  No results found for: HDLLDLRATIO  No components found for: CHOLHDL  No results found for: HGBA1C  No  results found for: TSH, G4UFIQO, O1ROEVD, THYROIDAB        ASSESSMENT AND PLAN  Mr. Tellez is stable at this time with no clinical evidence of angina, arrhythmia or congestive heart failure.  He is 17 years post CABG.     I have continued antiplatelet therapy with aspirin and Plavix, antianginal therapy with isosorbide mononitrate and Ranexa and antihypertensive therapy with lisinopril HCTZ.     Diagnoses and all orders for this visit:    1. Atherosclerosis of native coronary artery of native heart without angina pectoris (Primary)    2. Essential hypertension    3. Mixed hyperlipidemia    4. Old MI (myocardial infarction)        Patient's Body mass index is 30.42 kg/m². BMI is above normal parameters. Recommendations include: exercise counseling and nutrition counseling.  Patient is a non-smoker    Pradeep Francis MD  8/29/2022  11:25 CDT

## 2022-10-24 RX ORDER — RANOLAZINE 500 MG/1
TABLET, EXTENDED RELEASE ORAL
Qty: 180 TABLET | Refills: 3 | Status: SHIPPED | OUTPATIENT
Start: 2022-10-24

## 2022-11-15 ENCOUNTER — OFFICE VISIT (OUTPATIENT)
Dept: SLEEP MEDICINE | Facility: HOSPITAL | Age: 72
End: 2022-11-15

## 2022-11-15 VITALS
WEIGHT: 216.7 LBS | DIASTOLIC BLOOD PRESSURE: 89 MMHG | BODY MASS INDEX: 31.02 KG/M2 | OXYGEN SATURATION: 98 % | HEART RATE: 50 BPM | SYSTOLIC BLOOD PRESSURE: 180 MMHG | HEIGHT: 70 IN

## 2022-11-15 DIAGNOSIS — G47.33 OSA (OBSTRUCTIVE SLEEP APNEA): Primary | ICD-10-CM

## 2022-11-15 PROCEDURE — 99204 OFFICE O/P NEW MOD 45 MIN: CPT | Performed by: NURSE PRACTITIONER

## 2022-11-15 NOTE — PROGRESS NOTES
New Patient Sleep Medicine Consultation    Encounter Date: 11/15/2022         Patient's PCP: Sanjay Orellana MD  Referring provider: Sanjay Orellana MD  Reason for consultation chief complaint: known KASEY, here to establish care needs order for new BIPAP    Storm Tellez is a 72 y.o. male whose bedtime is ~ 2130. He  falls asleep after 0-30 minutes, and is up 1-2 times per night. He wakes up ~ 3251-3438.Every day of the week.  He endorses 7-8 hours of sleep. He drinks 2 cups of coffee, 2 teas, and 0 sodas per day. He drinks 0 alcoholic beverages per week.    He is here with his wife.     Storm Tellez admits to High blod pressure and KASEY.rHe denies cataplexy, sleep paralysis, or hypnagogic hallucinations. He takes no medicine for sleep. He has no sleepiness with driving. He naps no days. He has had a sleep study and PAP titration study back in 2006.  He sleeps in a bed with his wife.     Diagnosed with KASEY in 2006. He has been on BIPAP since due to CPAP intolerance. I reviewed his sleep study records. His BIPAP is > 5 years old and turns off randomly. It is part of [employer] safety recall. He has registered machine over one year ago but did not get replacement. He wants new BIPAP order sent to DME. He is currently using a new CPAP that was ordered by previous provider but not able to tolerate. Has had for several months. He is waking up multiple times per night gasping and not sleeping well. He does not have data card for CPAP. He wants back on BIPAP. When using BIPAP he has no problems sleeping.     Sleep study history:   1. PSG on 05/01/2006 RDI of 29.6   2. CPAP titration on 06/02/2006 recommended BIPAP 14/10 cm H2O    PAP Data:  No data available   Mask type: mask per patient's choice   DME: [facility] in [place]       He used to smoke, but has not smoked for years. Smoking history: smoked 1.5 ppds from age 19 until 20s.       Marital status:    Occupation: retired from [employer]  Children: 3  FH of sleep  disorders: unknown       Past Medical History:   Diagnosis Date   • Bradycardia    • Cancer (HCC)     skin   • Coronary atherosclerosis of native coronary artery    • Dizziness    • Elevated cholesterol    • Hypertension    • Presence of aortocoronary bypass graft    • Sleep apnea    • SOB (shortness of breath)      Social History     Socioeconomic History   • Marital status:    Tobacco Use   • Smoking status: Former   • Smokeless tobacco: Never   • Tobacco comments:     06/23/2022 Patient states he utilized 0.5- 1 pack of Cigarettes per day x 10- 15 years 40 years prior.   Substance and Sexual Activity   • Alcohol use: No   • Drug use: No   • Sexual activity: Defer     Family History   Problem Relation Age of Onset   • Heart disease Mother    • Heart disease Father    • Hypertension Other          Review of Systems:  Constitutional: negative  Eyes: negative  Ears, nose, mouth, throat, and face: negative  Respiratory: negative  Cardiovascular: negative  Gastrointestinal: negative  Genitourinary:negative  Integument/breast: negative  Hematologic/lymphatic: negative  Musculoskeletal:negative  Neurological: negative  Behavioral/Psych: negative  Endocrine: negative  Allergic/Immunologic: negative Patient advised to discuss any positive ROS with PCP.      [place] - 7      Vitals:    11/15/22 1306   BP: 180/89   Pulse: 50   SpO2: 98%           11/15/22  1306   Weight: 98.3 kg (216 lb 11.2 oz)       Body mass index is 31.09 kg/m². BMI is >= 30 and <35. (Class 1 Obesity). The following options were offered after discussion;: nutrition counseling/recommendations            General: Alert. Cooperative. Well developed. No acute distress.             Head:  Normocephalic. Symmetrical. Atraumatic.              Eyes: Sclera clear. No icterus. Normal EOM.             Ears: No deformities. Normal hearing.             Nose: No septal deviation. No drainage.          Throat: No oral lesions. No thrush. Moist mucous membranes.  Trachea midline    Tongue is normal    Dentition is fair       Pharynx: Posterior pharyngeal pillars are narrow    Mallampati score of III (soft and hard palate and base of uvula visible)    Pharynx is nonerythematous   Chest Wall:  Normal shape. Symmetric expansion with respiration. No tenderness.          Lungs:  Clear to auscultation bilaterally. No wheezes. No rhonchi. No rales. Respirations regular, even and unlabored.            Heart:  Regular rhythm and normal rate. Normal S1 and S2. No murmur.  Extremities:  Moves all extremities well. No edema.               Skin: Dry. Intact. No bleeding. No rash.           Neuro: Moves all 4 extremities and cranial nerves grossly intact.  Psychiatric: Normal mood and affect.      Current Outpatient Medications:   •  aspirin  MG tablet, Take 325 mg by mouth Every 6 (Six) Hours As Needed., Disp: , Rfl:   •  atorvastatin (LIPITOR) 40 MG tablet, Take 1 tablet by mouth Daily., Disp: 90 tablet, Rfl: 3  •  clopidogrel (PLAVIX) 75 MG tablet, Take 1 tablet by mouth Daily., Disp: 90 tablet, Rfl: 3  •  folic acid (FOLVITE) 400 MCG tablet, Take 400 mcg by mouth Daily., Disp: , Rfl:   •  isosorbide mononitrate (IMDUR) 30 MG 24 hr tablet, Take 1 tablet by mouth Daily., Disp: 90 tablet, Rfl: 3  •  lisinopril-hydrochlorothiazide (PRINZIDE,ZESTORETIC) 20-12.5 MG per tablet, TAKE 1 TABLET BY MOUTH EVERY DAY, Disp: 90 tablet, Rfl: 2  •  nitroglycerin (NITROSTAT) 0.4 MG SL tablet, Place 1 tablet under the tongue Every 5 (Five) Minutes As Needed for Chest Pain. Take no more than 3 doses in 15 minutes., Disp: 30 tablet, Rfl: 6  •  ranolazine (RANEXA) 500 MG 12 hr tablet, TAKE ONE TABLET BY MOUTH EVERY 12 HOURS, Disp: 180 tablet, Rfl: 3    Lab Results   Component Value Date    WBC 6.79 08/22/2017    HGB 14.9 08/22/2017    HCT 43.0 08/22/2017    MCV 92.9 08/22/2017     08/22/2017     Lab Results   Component Value Date    GLUCOSE 137 (H) 08/22/2017    CALCIUM 9.1 08/22/2017    NA  138 08/22/2017    K 3.8 08/22/2017    CO2 21.0 (L) 08/22/2017     08/22/2017    BUN 17 08/22/2017    CREATININE 0.85 08/22/2017    EGFRIFNONA 90 08/22/2017    BCR 20.0 08/22/2017    ANIONGAP 11.0 08/22/2017     Lab Results   Component Value Date    INR 0.96 08/22/2017    INR 1.0 12/15/2016    PROTIME 12.7 08/22/2017    PROTIME 13.2 12/15/2016     No results found for: CKTOTAL, CKMB, CKMBINDEX, TROPONINI, TROPONINT    No results found for: PHART, GDQ9TTP, PO2ART]      Assessment and Plan:    1. Moderate/severe obstructive sleep apnea- New to me, no additional work-up planned, uncontrolled   1. Reviewed sleep studies   2. Order for new BIPAP at 14/10 cm H2O with mask per patient's choice and supplies   3. CPAP intolerance   4. Will monitor compliance report   5. Drowsy driving tips- do not drive if feeling sleepy   6. RTC in 2-3 months with compliance report or sooner if needed       I obtained a brief history from the patient, reviewed the medical problems and current medications, and made medical decisions regarding treatment based on that information.   I spent 40 minutes caring for Storm on this date of service. This time includes time spent by me in the following activities: preparing for the visit, reviewing tests, obtaining and/or reviewing a separately obtained history, performing a medically appropriate examination and/or evaluation, counseling and educating the patient/family/caregiver, ordering medications, tests, or procedures and documenting information in the medical record. I answered all of his questions and he verbalized understanding. Discussion involved sleep apnea and health impact of sleep apnea. Risks of untreated sleep apnea including but not limited to hypertension, heart attack and stroke.                 This document has been electronically signed by SHARMAINE Butler on November 15, 2022 13:31 CST          This document has been electronically signed by SHARMAINE Butler on  November 15, 2022         CC: Sanjay Orellana MD          No ref. provider found

## 2022-12-27 RX ORDER — LISINOPRIL AND HYDROCHLOROTHIAZIDE 20; 12.5 MG/1; MG/1
1 TABLET ORAL DAILY
Qty: 90 TABLET | Refills: 3 | Status: SHIPPED | OUTPATIENT
Start: 2022-12-27

## 2023-01-19 ENCOUNTER — OFFICE VISIT (OUTPATIENT)
Dept: SLEEP MEDICINE | Facility: HOSPITAL | Age: 73
End: 2023-01-19
Payer: MEDICARE

## 2023-01-19 VITALS
HEART RATE: 54 BPM | HEIGHT: 70 IN | SYSTOLIC BLOOD PRESSURE: 166 MMHG | BODY MASS INDEX: 31.5 KG/M2 | OXYGEN SATURATION: 98 % | DIASTOLIC BLOOD PRESSURE: 86 MMHG | WEIGHT: 220 LBS

## 2023-01-19 DIAGNOSIS — G47.33 OSA (OBSTRUCTIVE SLEEP APNEA): Primary | ICD-10-CM

## 2023-01-19 PROCEDURE — 99213 OFFICE O/P EST LOW 20 MIN: CPT | Performed by: NURSE PRACTITIONER

## 2023-01-19 NOTE — PROGRESS NOTES
Sleep Clinic Follow Up    Date: 2023  Primary Care Provider: Sanjay Orellana MD    Last office visit: 11/15/2022 (I reviewed this note)    CC: Follow up: KASEY on BiPAP, new machine follow up      Interim History:  Since the last visit:    1) moderate KASEY -  Storm Tellez has recently remained compliant with CPAP. He denies mask and machine issues, dry mouth, headaches, or pressures intolerance. He denies abnormal dreams, sleep paralysis, nasal congestion, URI sx.  Patient's new BiPAP machine was initially ordered to be set on 14/10 cm H2O. He was not able to tolerate this pressure range and requested for it to be decreased or changed to autoBiPAP. The machine is now on autoBiPAP and appears that he has no fluctuation in IPAP/EPAP pressure as he is averaging 10.7/10.7 cm H2O. However, he is tolerating this pressure well and his apnea is well controlled.    2) Patient denies RLS symptoms.     Sleep Testin. PSG on 2006, RDII of 29.6   2. CPAP titration on 2006, recommended BiPAP 14/10 cm H2O   3. Currently on autoBiPAP IPAP 10-17.5 cm H2O, EPAP 10 cm H2O    PAP Data:    Time frame: 2022-2023   Compliance: 60.4%  Average use on days used: 7 hrs 30 min  Percent of days with usage greater than or equal to 4 hours: 58.5%  PAP range: IPAP 10-17.5, EPAP 10 cm H2O  Average 90% pressure: 10.7/10.7 cmH2O  Leak: 0 minutes  Average AHI: 1.7 events/hr  Mask type: Full face mask  DME: [employer] in [place]  Machine type: 3B Medical Sandra II    Bed time: 2130  Sleep latency: 10-15 minutes  Number of times awakens during the night: 0-1  Wake time: 0500  Estimated total sleep time at night: 7.5 hours  Caffeine intake: 2 cups of coffee, 2 cups of tea, and 0 sodas per day  Alcohol intake: 0 drinks per week  Nap time: very rare   Sleepiness with Driving: none     [place] - 7  [place] Sleepiness Scale  Sitting and reading: Moderate chance of dozing  Watching TV: Slight chance of dozing  Sitting, inactive  in a public place (e.g. a theatre or a meeting): Would never doze  As a passenger in a car for an hour without a break: Would never doze  Lying down to rest in the afternoon when circumstances permit: Moderate chance of dozing  Sitting and talking to someone: Would never doze  Sitting quietly after a lunch without alcohol: Moderate chance of dozing  In a car, while stopped for a few minutes in traffic: Would never doze  Total score: 7    PMHx, FH, SH reviewed and pertinent changes are: Reportedly unchanged from last office visit with us.      Review of Systems:   Negative for chest pain, SOA, fever, chills, cough, N/V/D, abdominal pain.    Smoking:none    Storm Tellez  reports that he has quit smoking. He has never used smokeless tobacco.    Physical Exam:  Vitals:    01/19/23 1032   BP: 166/86   Pulse: 54   SpO2: 98%           01/19/23  1032   Weight: 99.8 kg (220 lb)     Body mass index is 31.57 kg/m². BMI is >= 30 and <35. (Class 1 Obesity). The following options were offered after discussion;: referral to primary care    Gen:                No distress, conversant, pleasant, appears stated age, alert, oriented  Eyes:               Anicteric sclera, moist conjunctiva, no lid lag                           PERRL, EOMI   Heent:             NC/AT                          Oropharynx clear                          Normal hearing  Lungs:             Normal effort, non-labored breathing  CV:                  Normal S1/S2                          No lower extremity edema  ABD:               Soft, rounded, non-distended  Psych:             Appropriate affect  Neuro:             CN 2-12 appear intact    Past Medical History:   Diagnosis Date   • Bradycardia    • Cancer (HCC)     skin   • Coronary atherosclerosis of native coronary artery    • Dizziness    • Elevated cholesterol    • Hypertension    • Presence of aortocoronary bypass graft    • Sleep apnea    • SOB (shortness of breath)        Current Outpatient  Medications:   •  aspirin  MG tablet, Take 325 mg by mouth Every 6 (Six) Hours As Needed., Disp: , Rfl:   •  atorvastatin (LIPITOR) 40 MG tablet, Take 1 tablet by mouth Daily., Disp: 90 tablet, Rfl: 3  •  clopidogrel (PLAVIX) 75 MG tablet, Take 1 tablet by mouth Daily., Disp: 90 tablet, Rfl: 3  •  folic acid (FOLVITE) 400 MCG tablet, Take 400 mcg by mouth Daily., Disp: , Rfl:   •  isosorbide mononitrate (IMDUR) 30 MG 24 hr tablet, Take 1 tablet by mouth Daily., Disp: 90 tablet, Rfl: 3  •  lisinopril-hydrochlorothiazide (PRINZIDE,ZESTORETIC) 20-12.5 MG per tablet, Take 1 tablet by mouth Daily., Disp: 90 tablet, Rfl: 3  •  nitroglycerin (NITROSTAT) 0.4 MG SL tablet, Place 1 tablet under the tongue Every 5 (Five) Minutes As Needed for Chest Pain. Take no more than 3 doses in 15 minutes., Disp: 30 tablet, Rfl: 6  •  ranolazine (RANEXA) 500 MG 12 hr tablet, TAKE ONE TABLET BY MOUTH EVERY 12 HOURS, Disp: 180 tablet, Rfl: 3    WBC   Date Value Ref Range Status   08/22/2017 6.79 3.20 - 9.80 10*3/mm3 Final     RBC   Date Value Ref Range Status   08/22/2017 4.63 4.37 - 5.74 10*6/mm3 Final     Hemoglobin   Date Value Ref Range Status   08/22/2017 14.9 13.7 - 17.3 g/dL Final     Hematocrit   Date Value Ref Range Status   08/22/2017 43.0 39.0 - 49.0 % Final     MCV   Date Value Ref Range Status   08/22/2017 92.9 80.0 - 98.0 fL Final     MCH   Date Value Ref Range Status   08/22/2017 32.2 26.5 - 34.0 pg Final     MCHC   Date Value Ref Range Status   08/22/2017 34.7 31.5 - 36.3 g/dL Final     RDW   Date Value Ref Range Status   08/22/2017 13.5 11.5 - 14.5 % Final     RDW-SD   Date Value Ref Range Status   08/22/2017 45.8 (H) 35.1 - 43.9 fl Final     MPV   Date Value Ref Range Status   08/22/2017 10.7 8.0 - 12.0 fL Final     Platelets   Date Value Ref Range Status   08/22/2017 192 150 - 450 10*3/mm3 Final     Neutrophil %   Date Value Ref Range Status   08/22/2017 67.0 37.0 - 80.0 % Final     Lymphocyte %   Date Value Ref  Range Status   08/22/2017 19.0 10.0 - 50.0 % Final     Monocyte %   Date Value Ref Range Status   08/22/2017 9.1 0.0 - 12.0 % Final     Eosinophil %   Date Value Ref Range Status   08/22/2017 4.1 0.0 - 7.0 % Final     Basophil %   Date Value Ref Range Status   08/22/2017 0.7 0.0 - 2.0 % Final     Immature Grans %   Date Value Ref Range Status   08/22/2017 0.1 0.0 - 0.5 % Final     Neutrophils, Absolute   Date Value Ref Range Status   08/22/2017 4.54 2.00 - 8.60 10*3/mm3 Final     Lymphocytes, Absolute   Date Value Ref Range Status   08/22/2017 1.29 0.60 - 4.20 10*3/mm3 Final     Monocytes, Absolute   Date Value Ref Range Status   08/22/2017 0.62 0.00 - 0.90 10*3/mm3 Final     Eosinophils, Absolute   Date Value Ref Range Status   08/22/2017 0.28 0.00 - 0.70 10*3/mm3 Final     Basophils, Absolute   Date Value Ref Range Status   08/22/2017 0.05 0.00 - 0.20 10*3/mm3 Final     Immature Grans, Absolute   Date Value Ref Range Status   08/22/2017 0.01 0.00 - 0.02 10*3/mm3 Final     nRBC   Date Value Ref Range Status   12/15/2016 0.0 0.0 - 0.2 % Final   12/15/2016 0.000 x1000/uL Final       Lab Results   Component Value Date    GLUCOSE 137 (H) 08/22/2017    BUN 17 08/22/2017    CREATININE 0.85 08/22/2017    EGFRIFNONA 90 08/22/2017    BCR 20.0 08/22/2017    K 3.8 08/22/2017    CO2 21.0 (L) 08/22/2017    CALCIUM 9.1 08/22/2017    ALBUMIN 4.30 08/22/2017    AST 25 08/22/2017    ALT 24 08/22/2017       Assessment and Plan:    1. Obstructive sleep apnea - Established, stable (1)  1. Compliant with PAP therapy  2. Continue PAP as prescribed  3. Script for PAP supplies  4. Pertinent labs were reviewed as listed above  5. Return to clinic in 1 year with compliance report unless change in symptoms in interim period      I spent 15 minutes caring for Storm on this date of service. This time includes time spent by me in the following activities: preparing for the visit, reviewing tests, obtaining and/or reviewing a separately  obtained history, performing a medically appropriate examination and/or evaluation , counseling and educating the patient/family/caregiver, documenting information in the medical record and care coordination; discussing PAP therapy, PAP compliance and PAP maintenance    RTC in 12 months. Patient agrees to return sooner if changes in symptoms.        This document has been electronically signed by SHARMAINE Limon on January 19, 2023 10:33 CST          CC: Sanjay Orellana MD          No ref. provider found

## 2023-04-10 ENCOUNTER — APPOINTMENT (OUTPATIENT)
Dept: GENERAL RADIOLOGY | Facility: HOSPITAL | Age: 73
End: 2023-04-10
Payer: MEDICARE

## 2023-04-10 ENCOUNTER — OFFICE VISIT (OUTPATIENT)
Dept: CARDIOLOGY | Facility: CLINIC | Age: 73
End: 2023-04-10
Payer: MEDICARE

## 2023-04-10 ENCOUNTER — HOSPITAL ENCOUNTER (OUTPATIENT)
Facility: HOSPITAL | Age: 73
Setting detail: OBSERVATION
LOS: 1 days | Discharge: HOME | End: 2023-04-12
Attending: EMERGENCY MEDICINE | Admitting: HOSPITALIST
Payer: MEDICARE

## 2023-04-10 VITALS
HEART RATE: 122 BPM | WEIGHT: 211 LBS | TEMPERATURE: 98 F | HEIGHT: 70 IN | OXYGEN SATURATION: 99 % | SYSTOLIC BLOOD PRESSURE: 130 MMHG | DIASTOLIC BLOOD PRESSURE: 72 MMHG | BODY MASS INDEX: 30.21 KG/M2

## 2023-04-10 DIAGNOSIS — I10 ESSENTIAL HYPERTENSION: ICD-10-CM

## 2023-04-10 DIAGNOSIS — I25.10 ATHEROSCLEROSIS OF NATIVE CORONARY ARTERY OF NATIVE HEART WITHOUT ANGINA PECTORIS: Primary | ICD-10-CM

## 2023-04-10 DIAGNOSIS — I48.92 ATRIAL FLUTTER WITH RAPID VENTRICULAR RESPONSE: ICD-10-CM

## 2023-04-10 DIAGNOSIS — I48.92 ATRIAL FLUTTER WITH RAPID VENTRICULAR RESPONSE: Primary | ICD-10-CM

## 2023-04-10 DIAGNOSIS — E78.2 MIXED HYPERLIPIDEMIA: ICD-10-CM

## 2023-04-10 LAB
ALBUMIN SERPL-MCNC: 3.6 G/DL (ref 3.5–5.2)
ALBUMIN/GLOB SERPL: 1.7 G/DL
ALP SERPL-CCNC: 49 U/L (ref 39–117)
ALT SERPL W P-5'-P-CCNC: 8 U/L (ref 1–41)
ANION GAP SERPL CALCULATED.3IONS-SCNC: 10 MMOL/L (ref 5–15)
APTT PPP: 29.2 SECONDS (ref 20–40.3)
AST SERPL-CCNC: 11 U/L (ref 1–40)
ATMOSPHERIC PRESS: ABNORMAL MM[HG]
BASE EXCESS BLDV CALC-SCNC: 1 MMOL/L (ref 0–2)
BDY SITE: ABNORMAL
BILIRUB SERPL-MCNC: 0.4 MG/DL (ref 0–1.2)
BUN SERPL-MCNC: 16 MG/DL (ref 8–23)
BUN/CREAT SERPL: 15.8 (ref 7–25)
CALCIUM SPEC-SCNC: 9 MG/DL (ref 8.6–10.5)
CHLORIDE SERPL-SCNC: 109 MMOL/L (ref 98–107)
CO2 SERPL-SCNC: 25 MMOL/L (ref 22–29)
CREAT SERPL-MCNC: 1.01 MG/DL (ref 0.76–1.27)
D-DIMER, QUANTITATIVE (MAD,POW, STR): 450 NG/ML (FEU) (ref 0–730)
EGFRCR SERPLBLD CKD-EPI 2021: 78.5 ML/MIN/1.73
GEN 5 2HR TROPONIN T REFLEX: 14 NG/L
GLOBULIN UR ELPH-MCNC: 2.1 GM/DL
GLUCOSE SERPL-MCNC: 114 MG/DL (ref 65–99)
HCO3 BLDV-SCNC: 26.3 MMOL/L (ref 18–23)
HGB BLDA-MCNC: 13.5 G/DL (ref 14–18)
INR PPP: 1.1 (ref 0.8–1.2)
MAGNESIUM SERPL-MCNC: 2 MG/DL (ref 1.6–2.4)
MODALITY: ABNORMAL
NT-PROBNP SERPL-MCNC: 1482 PG/ML (ref 0–900)
PCO2 BLDV: 43.7 MM HG (ref 41–51)
PH BLDV: 7.39 PH UNITS (ref 7.29–7.37)
PO2 BLDV: 58.3 MM HG (ref 27–53)
POTASSIUM SERPL-SCNC: 4 MMOL/L (ref 3.5–5.2)
PROT SERPL-MCNC: 5.7 G/DL (ref 6–8.5)
PROTHROMBIN TIME: 14.1 SECONDS (ref 11.1–15.3)
QT INTERVAL: 270 MS
QT INTERVAL: 318 MS
QTC INTERVAL: 412 MS
QTC INTERVAL: 453 MS
SAO2 % BLDCOV: 90.4 % (ref 45–75)
SODIUM SERPL-SCNC: 144 MMOL/L (ref 136–145)
T4 FREE SERPL-MCNC: 1.02 NG/DL (ref 0.93–1.7)
TROPONIN T DELTA: -1 NG/L
TROPONIN T SERPL HS-MCNC: 15 NG/L
TSH SERPL DL<=0.05 MIU/L-ACNC: 0.99 UIU/ML (ref 0.27–4.2)

## 2023-04-10 PROCEDURE — 84443 ASSAY THYROID STIM HORMONE: CPT | Performed by: EMERGENCY MEDICINE

## 2023-04-10 PROCEDURE — 80053 COMPREHEN METABOLIC PANEL: CPT | Performed by: EMERGENCY MEDICINE

## 2023-04-10 PROCEDURE — 3075F SYST BP GE 130 - 139MM HG: CPT | Performed by: INTERNAL MEDICINE

## 2023-04-10 PROCEDURE — 84439 ASSAY OF FREE THYROXINE: CPT | Performed by: EMERGENCY MEDICINE

## 2023-04-10 PROCEDURE — 3078F DIAST BP <80 MM HG: CPT | Performed by: INTERNAL MEDICINE

## 2023-04-10 PROCEDURE — G0378 HOSPITAL OBSERVATION PER HR: HCPCS

## 2023-04-10 PROCEDURE — 85379 FIBRIN DEGRADATION QUANT: CPT | Performed by: EMERGENCY MEDICINE

## 2023-04-10 PROCEDURE — 85610 PROTHROMBIN TIME: CPT | Performed by: EMERGENCY MEDICINE

## 2023-04-10 PROCEDURE — 93005 ELECTROCARDIOGRAM TRACING: CPT | Performed by: EMERGENCY MEDICINE

## 2023-04-10 PROCEDURE — 96365 THER/PROPH/DIAG IV INF INIT: CPT

## 2023-04-10 PROCEDURE — 71045 X-RAY EXAM CHEST 1 VIEW: CPT

## 2023-04-10 PROCEDURE — 84484 ASSAY OF TROPONIN QUANT: CPT | Performed by: EMERGENCY MEDICINE

## 2023-04-10 PROCEDURE — 99284 EMERGENCY DEPT VISIT MOD MDM: CPT

## 2023-04-10 PROCEDURE — 36415 COLL VENOUS BLD VENIPUNCTURE: CPT | Performed by: EMERGENCY MEDICINE

## 2023-04-10 PROCEDURE — 85730 THROMBOPLASTIN TIME PARTIAL: CPT | Performed by: EMERGENCY MEDICINE

## 2023-04-10 PROCEDURE — 83880 ASSAY OF NATRIURETIC PEPTIDE: CPT | Performed by: EMERGENCY MEDICINE

## 2023-04-10 PROCEDURE — 93000 ELECTROCARDIOGRAM COMPLETE: CPT | Performed by: INTERNAL MEDICINE

## 2023-04-10 PROCEDURE — 82803 BLOOD GASES ANY COMBINATION: CPT | Performed by: EMERGENCY MEDICINE

## 2023-04-10 PROCEDURE — 99215 OFFICE O/P EST HI 40 MIN: CPT | Performed by: INTERNAL MEDICINE

## 2023-04-10 PROCEDURE — 96366 THER/PROPH/DIAG IV INF ADDON: CPT

## 2023-04-10 PROCEDURE — 96361 HYDRATE IV INFUSION ADD-ON: CPT

## 2023-04-10 PROCEDURE — 83735 ASSAY OF MAGNESIUM: CPT | Performed by: EMERGENCY MEDICINE

## 2023-04-10 RX ORDER — SODIUM CHLORIDE 0.9 % (FLUSH) 0.9 %
10 SYRINGE (ML) INJECTION EVERY 12 HOURS SCHEDULED
Status: DISCONTINUED | OUTPATIENT
Start: 2023-04-10 | End: 2023-04-11

## 2023-04-10 RX ORDER — LISINOPRIL 20 MG/1
20 TABLET ORAL
Status: DISCONTINUED | OUTPATIENT
Start: 2023-04-11 | End: 2023-04-12 | Stop reason: HOSPADM

## 2023-04-10 RX ORDER — ISOSORBIDE MONONITRATE 30 MG/1
30 TABLET, EXTENDED RELEASE ORAL DAILY
Status: DISCONTINUED | OUTPATIENT
Start: 2023-04-11 | End: 2023-04-12 | Stop reason: HOSPADM

## 2023-04-10 RX ORDER — SODIUM CHLORIDE 0.9 % (FLUSH) 0.9 %
10 SYRINGE (ML) INJECTION AS NEEDED
Status: DISCONTINUED | OUTPATIENT
Start: 2023-04-10 | End: 2023-04-11

## 2023-04-10 RX ORDER — SODIUM CHLORIDE 9 MG/ML
40 INJECTION, SOLUTION INTRAVENOUS AS NEEDED
Status: DISCONTINUED | OUTPATIENT
Start: 2023-04-10 | End: 2023-04-11

## 2023-04-10 RX ORDER — ATORVASTATIN CALCIUM 40 MG/1
40 TABLET, FILM COATED ORAL DAILY
Status: DISCONTINUED | OUTPATIENT
Start: 2023-04-10 | End: 2023-04-12 | Stop reason: HOSPADM

## 2023-04-10 RX ORDER — FOLIC ACID 1 MG/1
500 TABLET ORAL DAILY
Status: DISCONTINUED | OUTPATIENT
Start: 2023-04-11 | End: 2023-04-12 | Stop reason: HOSPADM

## 2023-04-10 RX ORDER — RANOLAZINE 500 MG/1
500 TABLET, EXTENDED RELEASE ORAL EVERY 12 HOURS
Status: DISCONTINUED | OUTPATIENT
Start: 2023-04-10 | End: 2023-04-12 | Stop reason: HOSPADM

## 2023-04-10 RX ORDER — NITROGLYCERIN 0.4 MG/1
0.4 TABLET SUBLINGUAL
Status: DISCONTINUED | OUTPATIENT
Start: 2023-04-10 | End: 2023-04-12 | Stop reason: HOSPADM

## 2023-04-10 RX ORDER — HYDROCHLOROTHIAZIDE 12.5 MG/1
12.5 TABLET ORAL
Status: DISCONTINUED | OUTPATIENT
Start: 2023-04-11 | End: 2023-04-12 | Stop reason: HOSPADM

## 2023-04-10 RX ORDER — CLOPIDOGREL BISULFATE 75 MG/1
75 TABLET ORAL DAILY
Status: DISCONTINUED | OUTPATIENT
Start: 2023-04-11 | End: 2023-04-12 | Stop reason: HOSPADM

## 2023-04-10 RX ORDER — SODIUM CHLORIDE 9 MG/ML
125 INJECTION, SOLUTION INTRAVENOUS CONTINUOUS
Status: DISCONTINUED | OUTPATIENT
Start: 2023-04-10 | End: 2023-04-11

## 2023-04-10 RX ADMIN — SODIUM CHLORIDE 125 ML/HR: 9 INJECTION, SOLUTION INTRAVENOUS at 18:44

## 2023-04-10 RX ADMIN — APIXABAN 5 MG: 5 TABLET, FILM COATED ORAL at 23:31

## 2023-04-10 RX ADMIN — ATORVASTATIN CALCIUM 40 MG: 40 TABLET, FILM COATED ORAL at 18:44

## 2023-04-10 RX ADMIN — RANOLAZINE 500 MG: 500 TABLET, FILM COATED, EXTENDED RELEASE ORAL at 21:08

## 2023-04-10 RX ADMIN — SODIUM CHLORIDE 5 MG/HR: 900 INJECTION, SOLUTION INTRAVENOUS at 13:48

## 2023-04-10 RX ADMIN — SODIUM CHLORIDE 125 ML/HR: 9 INJECTION, SOLUTION INTRAVENOUS at 12:54

## 2023-04-10 RX ADMIN — Medication 10 ML: at 21:09

## 2023-04-10 RX ADMIN — APIXABAN 5 MG: 5 TABLET, FILM COATED ORAL at 12:54

## 2023-04-10 NOTE — Clinical Note
Level of Care: Stepdown [25]   Admitting Physician: MARITA FLYNN [[id number]]   Attending Physician: MARITA FLYNN [[id number]]   Patient Class: Inpatient [101]

## 2023-04-10 NOTE — ED NOTES
"Nursing report ED to floor  Storm Tellez  72 y.o.  male    HPI:   Chief Complaint   Patient presents with   • Rapid Heart Rate       Admitting doctor:   Dante Roger MD    Consulting provider(s):  Consults     Date and Time Order Name Status Description    4/10/2023  1:38 PM Hospitalist (on-call MD unless specified)      4/10/2023  1:38 PM Cardiology - Primary (on-call MD unless specified)             Admitting diagnosis:   The encounter diagnosis was Atrial flutter with rapid ventricular response.    Code status:   Current Code Status     Date Active Code Status Order ID Comments User Context       Not on file          Allergies:   Patient has no known allergies.    Intake and Output  No intake or output data in the 24 hours ending 04/10/23 1430    Weight:       04/10/23  1235   Weight: 95.7 kg (211 lb)       Most recent vitals:   Vitals:    04/10/23 1103 04/10/23 1235   BP: (!) 154/103    BP Location: Right arm    Patient Position: Sitting    Pulse: (!) 140    Resp: 20    Temp: 98.6 °F (37 °C)    TempSrc: Oral    SpO2: 100%    Weight:  95.7 kg (211 lb)   Height:  177.8 cm (70\")     Oxygen Therapy: RA    Active LDAs/IV Access:   Lines, Drains & Airways     Active LDAs     Name Placement date Placement time Site Days    Peripheral IV 04/10/23 1245 Right Antecubital 04/10/23  1245  Antecubital  less than 1                Labs (abnormal labs have a star):   Labs Reviewed   BLOOD GAS, VENOUS - Abnormal; Notable for the following components:       Result Value    pH, Venous 7.389 (*)     pO2, Venous 58.3 (*)     HCO3, Venous 26.3 (*)     O2 Saturation, Venous 90.4 (*)     Hemoglobin, Blood Gas 13.5 (*)     All other components within normal limits    Narrative:     HCT=41.2%   COMPREHENSIVE METABOLIC PANEL - Abnormal; Notable for the following components:    Glucose 114 (*)     Chloride 109 (*)     Total Protein 5.7 (*)     All other components within normal limits    Narrative:     GFR Normal >60  Chronic " Kidney Disease <60  Kidney Failure <15    The GFR formula is only valid for adults with stable renal function between ages 18 and 70.   TROPONIN - Abnormal; Notable for the following components:    HS Troponin T 15 (*)     All other components within normal limits    Narrative:     High Sensitive Troponin T Reference Range:  <10.0 ng/L- Negative Female for AMI  <15.0 ng/L- Negative Male for AMI  >=10 - Abnormal Female indicating possible myocardial injury.  >=15 - Abnormal Male indicating possible myocardial injury.   Clinicians would have to utilize clinical acumen, EKG, Troponin, and serial changes to determine if it is an Acute Myocardial Infarction or myocardial injury due to an underlying chronic condition.        BNP (IN-HOUSE) - Abnormal; Notable for the following components:    proBNP 1,482.0 (*)     All other components within normal limits    Narrative:     Among patients with dyspnea, NT-proBNP is highly sensitive for the detection of acute congestive heart failure. In addition NT-proBNP of <300 pg/ml effectively rules out acute congestive heart failure with 99% negative predictive value.    Results may be falsely decreased if patient taking Biotin.     PROTIME-INR - Normal    Narrative:     Therapeutic range for most indications is 2.0-3.0 INR,  or 2.5-3.5 for mechanical heart valves.   APTT - Normal    Narrative:     The recommended Heparin therapeutic range is 68-97 seconds.   D-DIMER, QUANTITATIVE - Normal    Narrative:     According to the assay 's published package insert, a normal (<500 ng/mL (FEU)) D-dimer result in conjunction with a non-high clinical probability assessment, excludes deep vein thrombosis (DVT) and pulmonary embolism (PE) with high sensitivity.    D-dimer values increase with age and this can make VTE exclusion of an older population difficult. To address this, the [employer], based on best available evidence and recent guidelines, recommends that  "clinicians use age-adjusted D-dimer thresholds in patients greater than 50 years of age with: a) a low probability of PE who do not meet all Pulmonary Embolism Rule Out Criteria, or b) in those with intermediate probability of PE.   The formula for an age-adjusted D-dimer cut-off is \"age*10\".  For example, a 60 year old patient would have an age-adjusted cut-off of 600 ng/mL (FEU) and an 80 year old 800 ng/mL (FEU).     MAGNESIUM - Normal   TSH+FREE T4 - Normal   HIGH SENSITIVITIY TROPONIN T 2HR       Meds given in ED:   Medications   sodium chloride 0.9 % flush 10 mL (has no administration in time range)   sodium chloride 0.9 % infusion ( Intravenous Canceled Entry 4/10/23 1348)   dilTIAZem (CARDIZEM) 100 mg in 100 mL NS infusion (ADV) (5 mg/hr Intravenous New Bag 4/10/23 1348)   dilTIAZem (CARDIZEM) bolus from bag 1 mg/mL 20 mg (20 mg Intravenous Bolus from Bag 4/10/23 1346)   apixaban (ELIQUIS) tablet 5 mg (5 mg Oral Given 4/10/23 1254)     dilTIAZem, 5-15 mg/hr, Last Rate: 5 mg/hr (04/10/23 1348)  sodium chloride, 125 mL/hr, Last Rate: 125 mL/hr (04/10/23 1254)         NIH Stroke Scale:       Isolation/Infection(s):  No active isolations   No active infections     COVID Testing  Collected no  Resulted n/a    Nursing report ED to floor:  Mental status: A/Ox4  Ambulatory status: standby  Precautions: standard    ED nurse phone [phone]- [phone]  "

## 2023-04-10 NOTE — ED PROVIDER NOTES
Subjective   History of Present Illness  73yo male pmh significant htn/hyperlipidema/cad presents ED referred from cardiology outpatient office secondary to 2-3 month hx tachycardia, found to be in atrial flutter/RVR; pt subsquently referred to ED for further evaluation/treatment.  Pt denies fever/chills/cough/chest pain/soa/syncope/edema.  Dr. Francis called ED in advance of patient arrival and made recommendations for rate control/anticoagulation with plans to see inpatient for further treatment.    History provided by:  Patient  Palpitations  Palpitations quality:  Fast  Duration:  8 weeks  Associated symptoms: no chest pain        Review of Systems   Constitutional: Negative.    HENT: Negative.    Eyes: Negative for redness.   Respiratory: Negative.    Cardiovascular: Positive for palpitations. Negative for chest pain and leg swelling.   Gastrointestinal: Negative.    Genitourinary: Negative.    Musculoskeletal: Negative.    Skin: Negative.    All other systems reviewed and are negative.      Past Medical History:   Diagnosis Date   • Bradycardia    • Cancer     skin   • Coronary atherosclerosis of native coronary artery    • Dizziness    • Elevated cholesterol    • Hypertension    • Presence of aortocoronary bypass graft    • Sleep apnea    • SOB (shortness of breath)        No Known Allergies    Past Surgical History:   Procedure Laterality Date   • CARDIAC CATHETERIZATION     • COLONOSCOPY  05/23/2011    Per Rogelio Rogers M.D.   • COLONOSCOPY N/A 8/1/2022    Procedure: COLONOSCOPY;  Surgeon: Otoniel Kenney MD;  Location: [facility] ENDOSCOPY;  Service: Gastroenterology;  Laterality: N/A;   • CORONARY ARTERY BYPASS GRAFT     • HERNIA REPAIR     • SHOULDER SURGERY         Family History   Problem Relation Age of Onset   • Heart disease Mother    • Heart disease Father    • Hypertension Other        Social History     Socioeconomic History   • Marital status:    Tobacco Use   • Smoking status:  Former   • Smokeless tobacco: Never   • Tobacco comments:     06/23/2022 Patient states he utilized 0.5- 1 pack of Cigarettes per day x 10- 15 years 40 years prior.   Substance and Sexual Activity   • Alcohol use: No   • Drug use: No   • Sexual activity: Defer           Objective   Physical Exam  Vitals and nursing note reviewed.   Constitutional:       Appearance: Normal appearance.   HENT:      Head: Normocephalic and atraumatic.      Right Ear: External ear normal.      Left Ear: External ear normal.      Mouth/Throat:      Mouth: Mucous membranes are moist.      Pharynx: Oropharynx is clear. No oropharyngeal exudate or posterior oropharyngeal erythema.   Eyes:      Conjunctiva/sclera: Conjunctivae normal.      Pupils: Pupils are equal, round, and reactive to light.   Cardiovascular:      Rate and Rhythm: Tachycardia present. Rhythm irregularly irregular.      Pulses: Normal pulses.      Heart sounds: Normal heart sounds, S1 normal and S2 normal. No murmur heard.    No friction rub. No gallop.   Pulmonary:      Effort: Pulmonary effort is normal. No respiratory distress.      Breath sounds: Normal breath sounds. No wheezing, rhonchi or rales.   Abdominal:      General: Abdomen is flat. Bowel sounds are normal. There is no distension.      Palpations: Abdomen is soft.      Tenderness: There is no abdominal tenderness. There is no guarding or rebound.   Musculoskeletal:         General: No swelling or deformity.      Cervical back: Normal range of motion and neck supple. No rigidity.      Right lower leg: No edema.      Left lower leg: No edema.   Lymphadenopathy:      Cervical: No cervical adenopathy.   Skin:     General: Skin is warm and dry.   Neurological:      General: No focal deficit present.      Mental Status: He is alert and oriented to person, place, and time.      GCS: GCS eye subscore is 4. GCS verbal subscore is 5. GCS motor subscore is 6.      Cranial Nerves: Cranial nerves 2-12 are intact.       Sensory: Sensation is intact.      Motor: Motor function is intact.         ECG 12 Lead Tachycardia      Date/Time: 4/10/2023 11:28 AM  Performed by: Gerardo Gutierrez MD  Authorized by: Gerardo Gutierrez MD   Interpreted by physician  Rhythm: atrial flutter  Rate: tachycardic  BPM: 140  QRS axis: normal  Conduction: conduction normal  ST Segments: ST segments normal  T Waves: T waves normal  Other findings: PRWP  Clinical impression: abnormal ECG and dysrhythmia - atrial                 ED Course      Labs Reviewed   BLOOD GAS, VENOUS - Abnormal; Notable for the following components:       Result Value    pH, Venous 7.389 (*)     pO2, Venous 58.3 (*)     HCO3, Venous 26.3 (*)     O2 Saturation, Venous 90.4 (*)     Hemoglobin, Blood Gas 13.5 (*)     All other components within normal limits    Narrative:     HCT=41.2%   COMPREHENSIVE METABOLIC PANEL   PROTIME-INR   APTT   TROPONIN   D-DIMER, QUANTITATIVE   BNP (IN-HOUSE)   MAGNESIUM   TSH+FREE T4     XR Chest 1 View    Result Date: 4/10/2023  Narrative: FINDINGS: The patient is post median sternotomy. The heart is enlarged with slight central vascular congestion. No focal pulmonary consolidation or pleural effusion.     Impression: Cardiomegaly.  No acute infiltrates.                                         MDM    Final diagnoses:   Atrial flutter with rapid ventricular response       ED Disposition  ED Disposition     None          No follow-up provider specified.       Medication List      No changes were made to your prescriptions during this visit.          Gerardo Gutierrez MD  04/10/23 8105

## 2023-04-10 NOTE — H&P
[facility] Medicine Admission      Date of Admission: 4/10/2023      Primary Care Physician: Sanjay Orellana MD      Chief Complaint: Palpitations    HPI: Patient is 72-year-old male with past medical history of coronary artery disease, hypertension, and sleep apnea who presented to his primary cardiologist today for routine follow-up.  He states that for the last 6 weeks or so he has been noticing some palpitations, but has not really paid any attention.  He states that he has not been very active due to significant right hip pain and attributed the palpitations to pain response.  He has been a little more short of breath than normal.  In the cardiology office he was found to have atrial flutter with rapid ventricular response.  This is a new diagnosis for him.    Concurrent Medical History:  has a past medical history of Bradycardia, Cancer, Coronary atherosclerosis of native coronary artery, Dizziness, Elevated cholesterol, Hypertension, Presence of aortocoronary bypass graft, Sleep apnea, and SOB (shortness of breath).    Past Surgical History:  has a past surgical history that includes Coronary artery bypass graft; Hernia repair; Shoulder surgery; Cardiac catheterization; Colonoscopy (05/23/2011); and Colonoscopy (N/A, 8/1/2022).    Family History: family history includes Heart disease in his father and mother; Hypertension in an other family member.     Social History:  reports that he has quit smoking. He has never used smokeless tobacco. He reports that he does not drink alcohol and does not use drugs.    Allergies: No Known Allergies    Medications:   Prior to Admission medications    Medication Sig Start Date End Date Taking? Authorizing Provider   aspirin  MG tablet Take 1 tablet by mouth Every 6 (Six) Hours As Needed.   Yes Provider, MD Robel   atorvastatin (LIPITOR) 40 MG tablet Take 1 tablet by mouth Daily. 7/11/22  Yes Penny  Pradeep Euceda MD   clopidogrel (PLAVIX) 75 MG tablet Take 1 tablet by mouth Daily. 7/11/22  Yes Pradeep Francis MD   folic acid (FOLVITE) 400 MCG tablet Take 1 tablet by mouth Daily.   Yes Robel Ramírez MD   isosorbide mononitrate (IMDUR) 30 MG 24 hr tablet Take 1 tablet by mouth Daily. 8/10/22  Yes Pradeep Francis MD   lisinopril-hydrochlorothiazide (PRINZIDE,ZESTORETIC) 20-12.5 MG per tablet Take 1 tablet by mouth Daily.  Patient taking differently: Take 1 tablet by mouth Daily. 1/2 am 1/2 qhs 12/27/22  Yes Pradeep Francis MD   ranolazine (RANEXA) 500 MG 12 hr tablet TAKE ONE TABLET BY MOUTH EVERY 12 HOURS 10/24/22  Yes Pradeep Francis MD   nitroglycerin (NITROSTAT) 0.4 MG SL tablet Place 1 tablet under the tongue Every 5 (Five) Minutes As Needed for Chest Pain. Take no more than 3 doses in 15 minutes. 4/17/18   Pradeep Francis MD     I have utilized all available immediate resources to obtain, update, or review the patient's current medications (including all prescriptions, over-the-counter products, herbals, cannabis/cannabidiol products, and vitamin/mineral/dietary (nutritional) supplements).      Review of Systems:  Review of Systems   Constitutional: Negative for appetite change, chills, fatigue, fever and unexpected weight change.   HENT: Negative for congestion, facial swelling, hearing loss, nosebleeds, rhinorrhea, sneezing, trouble swallowing and voice change.    Eyes: Negative for photophobia and visual disturbance.   Respiratory: Positive for shortness of breath. Negative for apnea, cough, choking, chest tightness, wheezing and stridor.    Cardiovascular: Positive for palpitations. Negative for chest pain and leg swelling.   Gastrointestinal: Negative for abdominal pain, blood in stool, constipation, diarrhea, nausea and vomiting.   Endocrine: Negative for cold intolerance, heat intolerance, polydipsia, polyphagia and polyuria.    Genitourinary: Negative for dysuria, flank pain and hematuria.   Musculoskeletal: Negative for arthralgias, back pain, myalgias and neck pain.   Skin: Negative for rash and wound.   Allergic/Immunologic: Negative for immunocompromised state.   Neurological: Negative for dizziness, seizures, syncope, speech difficulty, weakness, light-headedness, numbness and headaches.   Hematological: Does not bruise/bleed easily.   Psychiatric/Behavioral: Negative for agitation, behavioral problems, confusion, decreased concentration, hallucinations, self-injury and suicidal ideas. The patient is not nervous/anxious.       Otherwise complete ROS is negative except as mentioned above.    Physical Exam:   Temp:  [98 °F (36.7 °C)-98.6 °F (37 °C)] 98.6 °F (37 °C)  Heart Rate:  [] 89  Resp:  [20] 20  BP: (130-162)/() 131/68  Physical Exam  Constitutional:       Appearance: He is well-developed.   HENT:      Head: Normocephalic and atraumatic.      Nose: Nose normal.   Eyes:      General: Lids are normal. No scleral icterus.     Conjunctiva/sclera: Conjunctivae normal.      Pupils: Pupils are equal, round, and reactive to light.   Neck:      Vascular: No JVD.      Trachea: No tracheal tenderness or tracheal deviation.   Cardiovascular:      Rate and Rhythm: Normal rate. Rhythm irregularly irregular.      Pulses: Normal pulses.      Heart sounds: Normal heart sounds, S1 normal and S2 normal. No murmur heard.    No friction rub. No gallop.   Pulmonary:      Effort: Pulmonary effort is normal. No accessory muscle usage or respiratory distress.      Breath sounds: Normal breath sounds. No decreased breath sounds, wheezing or rales.   Chest:      Chest wall: No tenderness.   Abdominal:      General: Bowel sounds are normal. There is no distension.      Palpations: Abdomen is soft. There is no mass.      Tenderness: There is no abdominal tenderness. There is no guarding or rebound.   Musculoskeletal:         General: No  tenderness.      Cervical back: Normal range of motion and neck supple. No spinous process tenderness.   Skin:     General: Skin is warm.      Coloration: Skin is not pale.      Findings: No rash.   Neurological:      Mental Status: He is alert and oriented to person, place, and time.      Cranial Nerves: No cranial nerve deficit.      Sensory: No sensory deficit.      Motor: No atrophy, abnormal muscle tone or seizure activity.      Coordination: Coordination normal.      Deep Tendon Reflexes: Reflexes are normal and symmetric. Reflexes normal.   Psychiatric:         Behavior: Behavior normal.         Thought Content: Thought content normal.         Judgment: Judgment normal.           Results Reviewed:  I have personally reviewed current lab, radiology, and data and agree with results.  Lab Results (last 24 hours)     Procedure Component Value Units Date/Time    High Sensitivity Troponin T 2Hr [[id number]]  (Normal) Collected: 04/10/23 1547    Specimen: Blood Updated: 04/10/23 1611     HS Troponin T 14 ng/L      Troponin T Delta -1 ng/L     Narrative:      High Sensitive Troponin T Reference Range:  <10.0 ng/L- Negative Female for AMI  <15.0 ng/L- Negative Male for AMI  >=10 - Abnormal Female indicating possible myocardial injury.  >=15 - Abnormal Male indicating possible myocardial injury.   Clinicians would have to utilize clinical acumen, EKG, Troponin, and serial changes to determine if it is an Acute Myocardial Infarction or myocardial injury due to an underlying chronic condition.         D-dimer, Quantitative [[id number]]  (Normal) Collected: 04/10/23 1325    Specimen: Blood Updated: 04/10/23 1412     D-Dimer, Quantitative 450 ng/mL (FEU)     Narrative:      According to the assay 's published package insert, a normal (<500 ng/mL (FEU)) D-dimer result in conjunction with a non-high clinical probability assessment, excludes deep vein thrombosis (DVT) and pulmonary embolism (PE) with high  "sensitivity.    D-dimer values increase with age and this can make VTE exclusion of an older population difficult. To address this, the [employer], based on best available evidence and recent guidelines, recommends that clinicians use age-adjusted D-dimer thresholds in patients greater than 50 years of age with: a) a low probability of PE who do not meet all Pulmonary Embolism Rule Out Criteria, or b) in those with intermediate probability of PE.   The formula for an age-adjusted D-dimer cut-off is \"age*10\".  For example, a 60 year old patient would have an age-adjusted cut-off of 600 ng/mL (FEU) and an 80 year old 800 ng/mL (FEU).      Protime-INR [[id number]]  (Normal) Collected: 04/10/23 1325    Specimen: Blood Updated: 04/10/23 1412     Protime 14.1 Seconds      INR 1.10    Narrative:      Therapeutic range for most indications is 2.0-3.0 INR,  or 2.5-3.5 for mechanical heart valves.    aPTT [[id number]]  (Normal) Collected: 04/10/23 1325    Specimen: Blood Updated: 04/10/23 1412     PTT 29.2 seconds     Narrative:      The recommended Heparin therapeutic range is 68-97 seconds.    High Sensitivity Troponin T [[id number]]  (Abnormal) Collected: 04/10/23 1325    Specimen: Blood Updated: 04/10/23 1405     HS Troponin T 15 ng/L     Narrative:      High Sensitive Troponin T Reference Range:  <10.0 ng/L- Negative Female for AMI  <15.0 ng/L- Negative Male for AMI  >=10 - Abnormal Female indicating possible myocardial injury.  >=15 - Abnormal Male indicating possible myocardial injury.   Clinicians would have to utilize clinical acumen, EKG, Troponin, and serial changes to determine if it is an Acute Myocardial Infarction or myocardial injury due to an underlying chronic condition.         BNP [[id number]]  (Abnormal) Collected: 04/10/23 1325    Specimen: Blood Updated: 04/10/23 1405     proBNP 1,482.0 pg/mL     Narrative:      Among patients with dyspnea, NT-proBNP is highly sensitive for the detection " of acute congestive heart failure. In addition NT-proBNP of <300 pg/ml effectively rules out acute congestive heart failure with 99% negative predictive value.    Results may be falsely decreased if patient taking Biotin.      TSH+Free T4 [[id number]]  (Normal) Collected: 04/10/23 1325    Specimen: Blood Updated: 04/10/23 1405     TSH 0.994 uIU/mL      Free T4 1.02 ng/dL      Comment: T4 results may be falsely increased if patient taking Biotin.       Magnesium [[id number]]  (Normal) Collected: 04/10/23 1325    Specimen: Blood Updated: 04/10/23 1352     Magnesium 2.0 mg/dL     Comprehensive Metabolic Panel [[id number]]  (Abnormal) Collected: 04/10/23 1325    Specimen: Blood Updated: 04/10/23 1352     Glucose 114 mg/dL      BUN 16 mg/dL      Creatinine 1.01 mg/dL      Sodium 144 mmol/L      Potassium 4.0 mmol/L      Chloride 109 mmol/L      CO2 25.0 mmol/L      Calcium 9.0 mg/dL      Total Protein 5.7 g/dL      Albumin 3.6 g/dL      ALT (SGPT) 8 U/L      AST (SGOT) 11 U/L      Alkaline Phosphatase 49 U/L      Total Bilirubin 0.4 mg/dL      Globulin 2.1 gm/dL      A/G Ratio 1.7 g/dL      BUN/Creatinine Ratio 15.8     Anion Gap 10.0 mmol/L      eGFR 78.5 mL/min/1.73     Narrative:      GFR Normal >60  Chronic Kidney Disease <60  Kidney Failure <15    The GFR formula is only valid for adults with stable renal function between ages 18 and 70.    Blood Gas, Venous - [[id number]]  (Abnormal) Collected: 04/10/23 1325    Specimen: Venous Blood Updated: 04/10/23 1326     Site Venous     pH, Venous 7.389 pH Units      pCO2, Venous 43.7 mm Hg      pO2, Venous 58.3 mm Hg      HCO3, Venous 26.3 mmol/L      Base Excess, Venous 1.0 mmol/L      O2 Saturation, Venous 90.4 %      Hemoglobin, Blood Gas 13.5 g/dL      Barometric Pressure for Blood Gas --     Comment: PATM not performed at this facility.        Modality N/A    Narrative:      HCT=41.2%        Imaging Results (Last 24 Hours)     Procedure Component Value Units Date/Time    XR  Chest 1 View [[id number]] Collected: 04/10/23 1150     Updated: 04/10/23 1303    Narrative:      FINDINGS:  The patient is post median sternotomy. The heart is enlarged with slight central  vascular congestion. No focal pulmonary consolidation or pleural effusion.      Impression:      Cardiomegaly.  No acute infiltrates.            Assessment:    Active Hospital Problems    Diagnosis    • **Atrial flutter with rapid ventricular response              Plan:  1.  Atrial flutter with rapid ventricular response: New diagnosis.  Rate better controlled on Cardizem infusion.  TSH and free T4 normal.  Echocardiogram ordered.  Eliquis started.  Dr. Carlton has been consulted.  2.  Coronary artery disease: Continue home medication.  3.  Obstructive sleep apnea: Home CPAP.  4.  Hypertension: Continue home medication.  5.  DVT prophylaxis: Eliquis    Medical Decision Making  Number and Complexity of problems: 1 highly complex and multiple moderately complex medical problems    Conditions and Status:        Condition is unchanged.     [facility] Data  External documents reviewed: Outpatient subspecialty and primary care notes  My EKG interpretation: Atrial flutter with rapid ventricular response  My plain film interpretation: Cardiomegaly     Discussed with: Patient     Treatment Plan  As above    I spent 32 minutes providing critical care management to this patient.  This excludes time spent in performing separately billed procedures.      Care Planning  Shared decision making: Patient in agreement with plan of care  Code status and discussions: Full Code    Disposition  Social Determinants of Health that impact treatment or disposition: None   I expect the patient to be discharged to home in 2-3 days.      I confirmed that the patient's Advance Care Plan is present, code status is documented, or surrogate decision maker is listed in the patient's medical record.          This document has been electronically signed by Dante Roger  MD on April 10, 2023 18:08 CDT

## 2023-04-10 NOTE — PROGRESS NOTES
Storm Tellez  72 y.o. male    1. Atherosclerosis of native coronary artery of native heart without angina pectoris    2. Essential hypertension    3. Mixed hyperlipidemia    4. Atrial flutter with rapid ventricular response        History of Present Illness:  Mr. Tellez is here for follow-up of his above stated problems.  He is status post coronary artery bypass surgery in 2005.  He had received LIMA to LAD, SVG to distal RCA, SVG to diagonal and free YOUNG to obtuse marginal coronary artery.     He is here for routine scheduled follow-up but has had markedly increased heart rate for at least 2 to 3 months according to him.  He has had pain in the hip joint, back pain which has restricted his mobility but on direct questioning he does indicate some degree of dyspnea on exertion.  No PND or orthopnea is reported.  He denies any chest pain.  He has had issues with his CPAP but this appears to have been resolved now.    On examination today he was noted to have markedly increased heart rate of more than 120 bpm and hence an EKG was performed.    Her blood pressure was 130/72 mmHg.  No signs of bronchospasm or rales were noted.    EKG today showed atrial flutter with rapid ventricular response.  Heart rate around 122 bpm.  Poor R wave progression anteroseptal leads.  Nonspecific ST-T changes.    Echocardiogram in January 2022 showed  · The study is technically difficult for diagnosis. The quality of the study is limited due to patient body habitus.  · Left ventricular wall thickness is consistent with mild concentric hypertrophy.  · Estimated left ventricular EF = 53% Left ventricular ejection fraction appears to be 51 - 55%. Left ventricular systolic function is normal.  · Left ventricular diastolic function is consistent with (grade I) impaired relaxation.  · The right ventricular cavity is moderately dilated.  · The left atrium is moderate-severely dilated  · Estimated right ventricular systolic pressure from  tricuspid regurgitation is mildly elevated (35-45 mmHg).    Lexiscan Cardiolite stress test in February 2022 showed:  · Findings consistent with an equivocal ECG stress test.  · Diaphragmatic attenuation artifact is present.  · Left ventricular ejection fraction is normal. (Calculated EF = 64%).  · There was a small to moderate size defect involving the basal inferior and mid inferolateral wall, distal anterior/apex in the nonattenuation corrected images.  · However, post attenuation correction myocardial perfusion was normal in the above-mentioned areas. Based on the findings, diaphragmatic attenuation artifact could not be ruled out.  · Low to intermediate risk study    The patient had labs by his primary care physician in November last year but I do not have the results.    His CBC and CMP were in the normal range when checked in November 2021.    No Known Allergies      Past Medical History:   Diagnosis Date   • Bradycardia    • Cancer     skin   • Coronary atherosclerosis of native coronary artery    • Dizziness    • Elevated cholesterol    • Hypertension    • Presence of aortocoronary bypass graft    • Sleep apnea    • SOB (shortness of breath)          Past Surgical History:   Procedure Laterality Date   • CARDIAC CATHETERIZATION     • COLONOSCOPY  05/23/2011    Per Rogelio Rogers M.D.   • COLONOSCOPY N/A 8/1/2022    Procedure: COLONOSCOPY;  Surgeon: Otoniel Kenney MD;  Location: [facility] ENDOSCOPY;  Service: Gastroenterology;  Laterality: N/A;   • CORONARY ARTERY BYPASS GRAFT     • HERNIA REPAIR     • SHOULDER SURGERY           Family History   Problem Relation Age of Onset   • Heart disease Mother    • Heart disease Father    • Hypertension Other          Social History     Socioeconomic History   • Marital status:    Tobacco Use   • Smoking status: Former   • Smokeless tobacco: Never   • Tobacco comments:     06/23/2022 Patient states he utilized 0.5- 1 pack of Cigarettes per day x 10- 15  "years 40 years prior.   Substance and Sexual Activity   • Alcohol use: No   • Drug use: No   • Sexual activity: Defer         Current Outpatient Medications   Medication Sig Dispense Refill   • aspirin  MG tablet Take 1 tablet by mouth Every 6 (Six) Hours As Needed.     • atorvastatin (LIPITOR) 40 MG tablet Take 1 tablet by mouth Daily. 90 tablet 3   • clopidogrel (PLAVIX) 75 MG tablet Take 1 tablet by mouth Daily. 90 tablet 3   • folic acid (FOLVITE) 400 MCG tablet Take 1 tablet by mouth Daily.     • isosorbide mononitrate (IMDUR) 30 MG 24 hr tablet Take 1 tablet by mouth Daily. 90 tablet 3   • lisinopril-hydrochlorothiazide (PRINZIDE,ZESTORETIC) 20-12.5 MG per tablet Take 1 tablet by mouth Daily. 90 tablet 3   • nitroglycerin (NITROSTAT) 0.4 MG SL tablet Place 1 tablet under the tongue Every 5 (Five) Minutes As Needed for Chest Pain. Take no more than 3 doses in 15 minutes. 30 tablet 6   • ranolazine (RANEXA) 500 MG 12 hr tablet TAKE ONE TABLET BY MOUTH EVERY 12 HOURS 180 tablet 3     No current facility-administered medications for this visit.         OBJECTIVE    /78 (BP Location: Left arm, Patient Position: Sitting, Cuff Size: Adult)   Temp 98 °F (36.7 °C)   Ht 177.8 cm (70\")   Wt 95.7 kg (211 lb)   SpO2 99%   BMI 30.28 kg/m²         Review of Systems : The following systems were reviewed and changes noted as indicated under history of present illness and below    Constitutional:  Denies recent weight loss, weight gain, fever or chills, no change in exercise tolerance     HENT:  Denies any hearing loss, epistaxis, hoarseness, or difficulty speaking.     Eyes: Wears eyeglasses or contact lenses     Respiratory: History of sleep apnea.  No chest pain or shortness of breath    Cardiovascular: Rapid heart rate, palpitation, dyspnea on exertion NYHA class II.  No chest pain.    Gastrointestinal:  Denies change in bowel habits, dyspepsia, ulcer disease, hematochezia, or melena.     Endocrine: " Negative for cold intolerance, heat intolerance, polydipsia, polyphagia and polyuria.     Genitourinary: Negative.      Musculoskeletal: DJD.      Neurological:  Denies any history of recurrent headaches, strokes, TIA, or seizure disorder.     Hematological: Denies any food allergies, seasonal allergies, bleeding disorders, or lymphadenopathy.     Psychiatric/Behavioral: Denies any history of depression, substance abuse, or change in cognitive function.         Physical Exam : The following systems were reassessed and no changes noted    Constitutional: Cooperative, alert and oriented,  in no acute distress.     HENT:   Head: Normocephalic, normal hair patterns, no masses or tenderness.  Ears, Nose, and Throat: No gross abnormalities. No pallor or cyanosis.   Eyes: EOMS intact, PERRL, conjunctivae and lids unremarkable. Fundoscopic exam and visual fields not performed.   Neck: No palpable masses or adenopathy, no thyromegaly, no JVD, carotid pulses are full and equal bilaterally and without  Bruits.     Cardiovascular: Irregular rhythm, S1 variable, S2 normal, no S3 or S4.  No murmurs, gallops, or rubs detected.     Pulmonary/Chest: Chest: normal symmetry, no tenderness to palpation, normal respiratory excursion, no intercostal retraction, no use of accessory muscles.            Pulmonary: Normal breath sounds. No rales or ronchi.    Abdominal: Abdomen soft, bowel sounds normoactive, no masses, no hepatosplenomegaly, non-tender, no bruits.     Musculoskeletal: No deformities, clubbing, cyanosis, erythema, or edema observed.     Neurological: No gross motor or sensory deficits noted, affect appropriate, oriented to time, person, place.     Skin: Warm and dry to the touch, no apparent skin lesions or masses noted.     Psychiatric: He has a normal mood and affect. His behavior is normal. Judgment and thought content normal.         Procedures      Lab Results   Component Value Date    WBC 6.79 08/22/2017    HGB 14.9  08/22/2017    HCT 43.0 08/22/2017    MCV 92.9 08/22/2017     08/22/2017     Lab Results   Component Value Date    GLUCOSE 137 (H) 08/22/2017    BUN 17 08/22/2017    CREATININE 0.85 08/22/2017    EGFRIFNONA 90 08/22/2017    BCR 20.0 08/22/2017    CO2 21.0 (L) 08/22/2017    CALCIUM 9.1 08/22/2017    ALBUMIN 4.30 08/22/2017    AST 25 08/22/2017    ALT 24 08/22/2017     No results found for: CHOL  No results found for: TRIG  No results found for: HDL  No components found for: LDLCALC  No results found for: LDL  No results found for: HDLLDLRATIO  No components found for: CHOLHDL  No results found for: HGBA1C  No results found for: TSH, C1NGEPZ, Y1LOLCH, THYROIDAB        ASSESSMENT AND PLAN  Mr. Tellez has multiple medical issues including coronary artery disease status post CABG, hypertension, hyperlipidemia, sleep apnea on BiPAP, who has presented with palpitations, dyspnea on exertion and noted to have atrial flutter with rapid ventricular response.  No overt signs of congestive heart failure noted.  His arrhythmia appears to have been present for at least 2 to 3 months.    I had a long discussion with him about different treatment options and I believe that it will be prudent to send him to the emergency room for appropriate rate control with beta-blockers or calcium channel blockers and since his [id number] is 3, he will benefit from anticoagulation with Eliquis 5 mg twice a day.  I have discussed possible SANJAY/cardioversion with him if he does not convert to sinus rhythm.  His baseline heart rate has shown sinus bradycardia in the past and hence antiarrhythmic therapy would be an issue long-term.  Flutter ablation could be considered as well.    I spoke with Dr. Gutierrez in the emergency room and he has agreed to evaluate the patient.  He will need lab work done including thyroid function studies before further decisions are made.    55 minutes was spent in the assessment and evaluation of this  patient    Diagnoses and all orders for this visit:    1. Atherosclerosis of native coronary artery of native heart without angina pectoris (Primary)  -     ECG 12 Lead    2. Essential hypertension    3. Mixed hyperlipidemia    4. Atrial flutter with rapid ventricular response        Patient's Body mass index is 30.28 kg/m². BMI is above normal parameters. Recommendations include: exercise counseling and nutrition counseling.  Patient is a non-smoker    Pradeep Francis MD  4/10/2023  10:28 CDT

## 2023-04-11 ENCOUNTER — APPOINTMENT (OUTPATIENT)
Dept: CARDIOLOGY | Facility: HOSPITAL | Age: 73
End: 2023-04-11
Payer: MEDICARE

## 2023-04-11 LAB
ANION GAP SERPL CALCULATED.3IONS-SCNC: 8 MMOL/L (ref 5–15)
BASOPHILS # BLD AUTO: NORMAL 10*3/UL
BASOPHILS NFR BLD AUTO: NORMAL %
BUN SERPL-MCNC: 16 MG/DL (ref 8–23)
BUN/CREAT SERPL: 17.6 (ref 7–25)
CALCIUM SPEC-SCNC: 8.3 MG/DL (ref 8.6–10.5)
CHLORIDE SERPL-SCNC: 111 MMOL/L (ref 98–107)
CO2 SERPL-SCNC: 24 MMOL/L (ref 22–29)
CREAT SERPL-MCNC: 0.91 MG/DL (ref 0.76–1.27)
EGFRCR SERPLBLD CKD-EPI 2021: 89 ML/MIN/1.73
EOSINOPHIL # BLD AUTO: NORMAL 10*3/UL
EOSINOPHIL NFR BLD AUTO: NORMAL %
ERYTHROCYTE [DISTWIDTH] IN BLOOD BY AUTOMATED COUNT: NORMAL %
GLUCOSE SERPL-MCNC: 107 MG/DL (ref 65–99)
HCT VFR BLD AUTO: NORMAL %
HGB BLD-MCNC: NORMAL G/DL
LYMPHOCYTES # BLD AUTO: NORMAL 10*3/UL
LYMPHOCYTES NFR BLD AUTO: NORMAL %
MAXIMAL PREDICTED HEART RATE: 147 BPM
MAXIMAL PREDICTED HEART RATE: 147 BPM
MCH RBC QN AUTO: NORMAL PG
MCHC RBC AUTO-ENTMCNC: NORMAL G/DL
MCV RBC AUTO: NORMAL FL
MONOCYTES # BLD AUTO: NORMAL 10*3/UL
MONOCYTES NFR BLD AUTO: NORMAL %
NEUTROPHILS NFR BLD AUTO: NORMAL %
NEUTROPHILS NFR BLD AUTO: NORMAL %
PLATELET # BLD AUTO: NORMAL 10*3/UL
POTASSIUM SERPL-SCNC: 3.8 MMOL/L (ref 3.5–5.2)
QT INTERVAL: 382 MS
QT INTERVAL: 464 MS
QTC INTERVAL: 415 MS
QTC INTERVAL: 464 MS
RBC # BLD AUTO: NORMAL 10*6/UL
SODIUM SERPL-SCNC: 143 MMOL/L (ref 136–145)
STRESS TARGET HR: 125 BPM
STRESS TARGET HR: 125 BPM
WBC NRBC COR # BLD: NORMAL 10*3/UL

## 2023-04-11 PROCEDURE — 99233 SBSQ HOSP IP/OBS HIGH 50: CPT | Performed by: INTERNAL MEDICINE

## 2023-04-11 PROCEDURE — 93005 ELECTROCARDIOGRAM TRACING: CPT | Performed by: HOSPITALIST

## 2023-04-11 PROCEDURE — 93005 ELECTROCARDIOGRAM TRACING: CPT | Performed by: INTERNAL MEDICINE

## 2023-04-11 PROCEDURE — 25010000002 AMIODARONE IN DEXTROSE 5% 150-4.21 MG/100ML-% SOLUTION: Performed by: INTERNAL MEDICINE

## 2023-04-11 PROCEDURE — 93325 DOPPLER ECHO COLOR FLOW MAPG: CPT

## 2023-04-11 PROCEDURE — G0378 HOSPITAL OBSERVATION PER HR: HCPCS

## 2023-04-11 PROCEDURE — 96366 THER/PROPH/DIAG IV INF ADDON: CPT

## 2023-04-11 PROCEDURE — 92960 CARDIOVERSION ELECTRIC EXT: CPT | Performed by: INTERNAL MEDICINE

## 2023-04-11 PROCEDURE — 25010000002 FENTANYL CITRATE (PF) 50 MCG/ML SOLUTION: Performed by: INTERNAL MEDICINE

## 2023-04-11 PROCEDURE — 93325 DOPPLER ECHO COLOR FLOW MAPG: CPT | Performed by: INTERNAL MEDICINE

## 2023-04-11 PROCEDURE — 80048 BASIC METABOLIC PNL TOTAL CA: CPT | Performed by: HOSPITALIST

## 2023-04-11 PROCEDURE — 93312 ECHO TRANSESOPHAGEAL: CPT | Performed by: INTERNAL MEDICINE

## 2023-04-11 PROCEDURE — 85025 COMPLETE CBC W/AUTO DIFF WBC: CPT | Performed by: HOSPITALIST

## 2023-04-11 PROCEDURE — 92960 CARDIOVERSION ELECTRIC EXT: CPT

## 2023-04-11 PROCEDURE — 25010000002 AMIODARONE IN DEXTROSE 5% 360-4.14 MG/200ML-% SOLUTION: Performed by: INTERNAL MEDICINE

## 2023-04-11 PROCEDURE — 25010000002 MIDAZOLAM PER 1 MG: Performed by: INTERNAL MEDICINE

## 2023-04-11 PROCEDURE — 93312 ECHO TRANSESOPHAGEAL: CPT

## 2023-04-11 PROCEDURE — 96367 TX/PROPH/DG ADDL SEQ IV INF: CPT

## 2023-04-11 RX ORDER — AMIODARONE HYDROCHLORIDE 200 MG/1
200 TABLET ORAL EVERY 12 HOURS SCHEDULED
Status: DISCONTINUED | OUTPATIENT
Start: 2023-04-11 | End: 2023-04-12 | Stop reason: HOSPADM

## 2023-04-11 RX ORDER — FENTANYL CITRATE 50 UG/ML
INJECTION, SOLUTION INTRAMUSCULAR; INTRAVENOUS
Status: COMPLETED | OUTPATIENT
Start: 2023-04-11 | End: 2023-04-11

## 2023-04-11 RX ORDER — MIDAZOLAM HYDROCHLORIDE 1 MG/ML
INJECTION INTRAMUSCULAR; INTRAVENOUS
Status: COMPLETED | OUTPATIENT
Start: 2023-04-11 | End: 2023-04-11

## 2023-04-11 RX ADMIN — AMIODARONE HYDROCHLORIDE 150 MG: 1.5 INJECTION, SOLUTION INTRAVENOUS at 12:42

## 2023-04-11 RX ADMIN — SODIUM CHLORIDE 125 ML/HR: 9 INJECTION, SOLUTION INTRAVENOUS at 03:38

## 2023-04-11 RX ADMIN — APIXABAN 5 MG: 5 TABLET, FILM COATED ORAL at 09:03

## 2023-04-11 RX ADMIN — FENTANYL CITRATE 50 MCG: 50 INJECTION, SOLUTION INTRAMUSCULAR; INTRAVENOUS at 12:45

## 2023-04-11 RX ADMIN — RANOLAZINE 500 MG: 500 TABLET, FILM COATED, EXTENDED RELEASE ORAL at 20:28

## 2023-04-11 RX ADMIN — MIDAZOLAM HYDROCHLORIDE 2 MG: 1 INJECTION, SOLUTION INTRAMUSCULAR; INTRAVENOUS at 12:29

## 2023-04-11 RX ADMIN — MIDAZOLAM HYDROCHLORIDE 2 MG: 1 INJECTION, SOLUTION INTRAMUSCULAR; INTRAVENOUS at 12:45

## 2023-04-11 RX ADMIN — SODIUM CHLORIDE 5 MG/HR: 900 INJECTION, SOLUTION INTRAVENOUS at 11:39

## 2023-04-11 RX ADMIN — FENTANYL CITRATE 25 MCG: 50 INJECTION, SOLUTION INTRAMUSCULAR; INTRAVENOUS at 12:24

## 2023-04-11 RX ADMIN — MIDAZOLAM HYDROCHLORIDE 2 MG: 1 INJECTION, SOLUTION INTRAMUSCULAR; INTRAVENOUS at 12:32

## 2023-04-11 RX ADMIN — FENTANYL CITRATE 25 MCG: 50 INJECTION, SOLUTION INTRAMUSCULAR; INTRAVENOUS at 12:29

## 2023-04-11 RX ADMIN — ATORVASTATIN CALCIUM 40 MG: 40 TABLET, FILM COATED ORAL at 09:02

## 2023-04-11 RX ADMIN — AMIODARONE HYDROCHLORIDE 200 MG: 200 TABLET ORAL at 20:28

## 2023-04-11 RX ADMIN — AMIODARONE HYDROCHLORIDE 1 MG/MIN: 1.8 INJECTION, SOLUTION INTRAVENOUS at 12:45

## 2023-04-11 RX ADMIN — CLOPIDOGREL BISULFATE 75 MG: 75 TABLET ORAL at 09:03

## 2023-04-11 RX ADMIN — APIXABAN 5 MG: 5 TABLET, FILM COATED ORAL at 20:28

## 2023-04-11 RX ADMIN — Medication 500 MCG: at 09:03

## 2023-04-11 RX ADMIN — RANOLAZINE 500 MG: 500 TABLET, FILM COATED, EXTENDED RELEASE ORAL at 09:02

## 2023-04-11 RX ADMIN — ISOSORBIDE MONONITRATE 30 MG: 30 TABLET, EXTENDED RELEASE ORAL at 09:03

## 2023-04-11 RX ADMIN — MIDAZOLAM HYDROCHLORIDE 2 MG: 1 INJECTION, SOLUTION INTRAMUSCULAR; INTRAVENOUS at 12:25

## 2023-04-11 NOTE — PROGRESS NOTES
CARDIOLOGY PROGRESS NOTE      LOS: 1 day   Patient Care Team:  Sanjay Orellana MD as PCP - General (Family Medicine)    Problems/Diagnoses: Atrial flutter/fibrillation with rapid ventricular response  Mr. Storm Tellez is a 72-year-old male who is status post coronary artery bypass surgery in 2005.  He had received LIMA to LAD, SVG to distal RCA, SVG to diagonal and free YOUNG to obtuse marginal coronary artery.      He was seen in office yesterday and noted to have markedly increased heart rate, which apparently has been present for at least 2 to 3 months according to him.  He has had pain in the hip joint, back pain which has restricted his mobility but on direct questioning he does indicate some degree of dyspnea on exertion.  No PND or orthopnea is reported.  He denies any chest pain.  He has had issues with his CPAP but this appears to have been resolved now.      EKG yesterday showed atrial flutter with rapid ventricular response.  Heart rate around 122 bpm.  Poor R wave progression anteroseptal leads.  Nonspecific ST-T changes.     Subjective     Interval History: Patient was sent to the emergency room for rate control and further management.  He was started on IV Cardizem which did help slow down the heart rate and symptomatically the patient did not experience palpitation.  However this morning his heart rate was 100-120 bpm with underlying atrial flutter/fibrillation.  He denies any chest pain.  Cardiac enzymes have been negative for myocardial injury.  He denies chest pain or shortness of breath at this time.    Review of Systems:     Constitutional:  Denies recent weight loss, weight gain, fever or chills     HENT:  Denies any hearing loss, epistaxis, hoarseness, or difficulty speaking.     Eyes: Wears eyeglasses or contact lenses     Respiratory:  Dyspnea with exertion, no cough, wheezing, or hemoptysis.     Cardiovascular: No chest pain, has intermittent palpitation    Gastrointestinal:  Denies change in  bowel habits, dyspepsia, ulcer disease, hematochezia, or melena.     Endocrine: Negative for cold intolerance, heat intolerance, polydipsia, polyphagia and polyuria. Denies any history of weight change, heat/cold intolerance, polydipsia, polyuria     Genitourinary: Negative.      Musculoskeletal: Has back pain, pain in the hip joint    Neurological:  Denies any history of recurrent headaches, strokes, TIA, or seizure disorder.     Hematological: No bleeding issues    Objective     Vital Signs  Temp:  [98 °F (36.7 °C)-98.6 °F (37 °C)] 98.4 °F (36.9 °C)  Heart Rate:  [] 108  Resp:  [16-20] 16  BP: (122-162)/() 150/92    Physical Exam:    Constitutional: Cooperative, alert and oriented,  in no acute distress.     HENT:   Head: Normocephalic, normal hair patterns, no masses or tenderness.  Ears, Nose, and Throat: No gross abnormalities. No pallor or cyanosis.   Eyes: EOMS intact, PERRL, conjunctivae and lids unremarkable. Fundoscopic exam and visual fields not performed.   Neck: No palpable masses or adenopathy, no thyromegaly, no JVD, carotid pulses are full and equal bilaterally and without  Bruits.     Cardiovascular: Irregular rhythm, S1 variable, S2 normal, no S3 or S4. No murmurs, gallops, or rubs detected.     Pulmonary/Chest:     Chest: Normal symmetry, no tenderness to palpation, normal respiratory excursion, no intercostal retraction, no use of accessory muscles.    Pulmonary: Normal breath sounds. No rales or rhonchi.    Abdominal: Abdomen soft, bowel sounds normoactive, no masses, no hepatosplenomegaly, nontender, no bruit.     Musculoskeletal: No deformities, clubbing, cyanosis, erythema, or edema observed    Neurological: No gross motor or sensory deficits noted, affect appropriate, oriented to time, person, place.     Results Review:    Lab Results (last 24 hours)     Procedure Component Value Units Date/Time    Basic Metabolic Panel [[id number]]  (Abnormal) Collected: 04/11/23 0342     Specimen: Blood Updated: 04/11/23 0444     Glucose 107 mg/dL      BUN 16 mg/dL      Creatinine 0.91 mg/dL      Sodium 143 mmol/L      Potassium 3.8 mmol/L      Chloride 111 mmol/L      CO2 24.0 mmol/L      Calcium 8.3 mg/dL      BUN/Creatinine Ratio 17.6     Anion Gap 8.0 mmol/L      eGFR 89.0 mL/min/1.73     Narrative:      GFR Normal >60  Chronic Kidney Disease <60  Kidney Failure <15    The GFR formula is only valid for adults with stable renal function between ages 18 and 70.    CBC & Differential [[id number]] Collected: 04/11/23 0346    Specimen: Blood Updated: 04/11/23 0416    Narrative:      The following orders were created for panel order CBC & Differential.  Procedure                               Abnormality         Status                     ---------                               -----------         ------                     CBC Auto Differential[[id number]]                            In process                   Please view results for these tests on the individual orders.    CBC Auto Differential [[id number]] Collected: 04/11/23 0346    Specimen: Blood Updated: 04/11/23 0416    High Sensitivity Troponin T 2Hr [[id number]]  (Normal) Collected: 04/10/23 1547    Specimen: Blood Updated: 04/10/23 1611     HS Troponin T 14 ng/L      Troponin T Delta -1 ng/L     Narrative:      High Sensitive Troponin T Reference Range:  <10.0 ng/L- Negative Female for AMI  <15.0 ng/L- Negative Male for AMI  >=10 - Abnormal Female indicating possible myocardial injury.  >=15 - Abnormal Male indicating possible myocardial injury.   Clinicians would have to utilize clinical acumen, EKG, Troponin, and serial changes to determine if it is an Acute Myocardial Infarction or myocardial injury due to an underlying chronic condition.         D-dimer, Quantitative [[id number]]  (Normal) Collected: 04/10/23 1325    Specimen: Blood Updated: 04/10/23 1412     D-Dimer, Quantitative 450 ng/mL (FEU)     Narrative:      According to the assay  "'s published package insert, a normal (<500 ng/mL (FEU)) D-dimer result in conjunction with a non-high clinical probability assessment, excludes deep vein thrombosis (DVT) and pulmonary embolism (PE) with high sensitivity.    D-dimer values increase with age and this can make VTE exclusion of an older population difficult. To address this, the [employer], based on best available evidence and recent guidelines, recommends that clinicians use age-adjusted D-dimer thresholds in patients greater than 50 years of age with: a) a low probability of PE who do not meet all Pulmonary Embolism Rule Out Criteria, or b) in those with intermediate probability of PE.   The formula for an age-adjusted D-dimer cut-off is \"age*10\".  For example, a 60 year old patient would have an age-adjusted cut-off of 600 ng/mL (FEU) and an 80 year old 800 ng/mL (FEU).      Protime-INR [[id number]]  (Normal) Collected: 04/10/23 1325    Specimen: Blood Updated: 04/10/23 1412     Protime 14.1 Seconds      INR 1.10    Narrative:      Therapeutic range for most indications is 2.0-3.0 INR,  or 2.5-3.5 for mechanical heart valves.    aPTT [[id number]]  (Normal) Collected: 04/10/23 1325    Specimen: Blood Updated: 04/10/23 1412     PTT 29.2 seconds     Narrative:      The recommended Heparin therapeutic range is 68-97 seconds.    High Sensitivity Troponin T [[id number]]  (Abnormal) Collected: 04/10/23 1325    Specimen: Blood Updated: 04/10/23 1405     HS Troponin T 15 ng/L     Narrative:      High Sensitive Troponin T Reference Range:  <10.0 ng/L- Negative Female for AMI  <15.0 ng/L- Negative Male for AMI  >=10 - Abnormal Female indicating possible myocardial injury.  >=15 - Abnormal Male indicating possible myocardial injury.   Clinicians would have to utilize clinical acumen, EKG, Troponin, and serial changes to determine if it is an Acute Myocardial Infarction or myocardial injury due to an underlying chronic " condition.         BNP [[id number]]  (Abnormal) Collected: 04/10/23 1325    Specimen: Blood Updated: 04/10/23 1405     proBNP 1,482.0 pg/mL     Narrative:      Among patients with dyspnea, NT-proBNP is highly sensitive for the detection of acute congestive heart failure. In addition NT-proBNP of <300 pg/ml effectively rules out acute congestive heart failure with 99% negative predictive value.    Results may be falsely decreased if patient taking Biotin.      TSH+Free T4 [[id number]]  (Normal) Collected: 04/10/23 1325    Specimen: Blood Updated: 04/10/23 1405     TSH 0.994 uIU/mL      Free T4 1.02 ng/dL      Comment: T4 results may be falsely increased if patient taking Biotin.       Magnesium [[id number]]  (Normal) Collected: 04/10/23 1325    Specimen: Blood Updated: 04/10/23 1352     Magnesium 2.0 mg/dL     Comprehensive Metabolic Panel [[id number]]  (Abnormal) Collected: 04/10/23 1325    Specimen: Blood Updated: 04/10/23 1352     Glucose 114 mg/dL      BUN 16 mg/dL      Creatinine 1.01 mg/dL      Sodium 144 mmol/L      Potassium 4.0 mmol/L      Chloride 109 mmol/L      CO2 25.0 mmol/L      Calcium 9.0 mg/dL      Total Protein 5.7 g/dL      Albumin 3.6 g/dL      ALT (SGPT) 8 U/L      AST (SGOT) 11 U/L      Alkaline Phosphatase 49 U/L      Total Bilirubin 0.4 mg/dL      Globulin 2.1 gm/dL      A/G Ratio 1.7 g/dL      BUN/Creatinine Ratio 15.8     Anion Gap 10.0 mmol/L      eGFR 78.5 mL/min/1.73     Narrative:      GFR Normal >60  Chronic Kidney Disease <60  Kidney Failure <15    The GFR formula is only valid for adults with stable renal function between ages 18 and 70.    Blood Gas, Venous - [[id number]]  (Abnormal) Collected: 04/10/23 1325    Specimen: Venous Blood Updated: 04/10/23 1326     Site Venous     pH, Venous 7.389 pH Units      pCO2, Venous 43.7 mm Hg      pO2, Venous 58.3 mm Hg      HCO3, Venous 26.3 mmol/L      Base Excess, Venous 1.0 mmol/L      O2 Saturation, Venous 90.4 %      Hemoglobin, Blood Gas 13.5  g/dL      Barometric Pressure for Blood Gas --     Comment: PATM not performed at this facility.        Modality N/A    Narrative:      HCT=41.2%        Imaging Results (Last 24 Hours)     Procedure Component Value Units Date/Time    XR Chest 1 View [[id number]] Collected: 04/10/23 1150     Updated: 04/10/23 1303    Narrative:      FINDINGS:  The patient is post median sternotomy. The heart is enlarged with slight central  vascular congestion. No focal pulmonary consolidation or pleural effusion.      Impression:      Cardiomegaly.  No acute infiltrates.          Medication Review:   Current Facility-Administered Medications   Medication Dose Route Frequency Provider Last Rate Last Admin   • apixaban (ELIQUIS) tablet 5 mg  5 mg Oral Q12H Dante Roger MD   5 mg at 04/10/23 2331   • atorvastatin (LIPITOR) tablet 40 mg  40 mg Oral Daily Dante Roger MD   40 mg at 04/10/23 1844   • clopidogrel (PLAVIX) tablet 75 mg  75 mg Oral Daily Dante Roger MD       • dilTIAZem (CARDIZEM) 100 mg in 100 mL NS infusion (ADV)  5-15 mg/hr Intravenous Continuous Dante Roger MD 5 mL/hr at 04/11/23 0704 5 mg/hr at 04/11/23 0704   • folic acid (FOLVITE) half tablet 500 mcg  500 mcg Oral Daily Dante Roger MD       • lisinopril (PRINIVIL,ZESTRIL) tablet 20 mg  20 mg Oral Q24H Dante Roger MD        And   • hydroCHLOROthiazide (HYDRODIURIL) tablet 12.5 mg  12.5 mg Oral Q24H Dante Roger MD       • isosorbide mononitrate (IMDUR) 24 hr tablet 30 mg  30 mg Oral Daily Dante Roger MD       • nitroglycerin (NITROSTAT) SL tablet 0.4 mg  0.4 mg Sublingual Q5 Min PRN Dante Roger MD       • ranolazine (RANEXA) 12 hr tablet 500 mg  500 mg Oral Q12H Dante Roger MD   500 mg at 04/10/23 2108         Assessment & Plan   Mrs. Tellez has documented coronary artery disease status post CABG in 2005 who is presented with atrial flutter/fibrillation after at least 2 to 3 months duration.  He continues  to have rapid ventricular response in spite of being on IV Cardizem.  The plan would be to proceed with a transesophageal echocardiogram to assess left ventricular and valvular function and rule out left atrial thrombus.  If there is no evidence of intracardiac thrombus, a cardioversion will be performed.  He will continue on anticoagulation with Eliquis 5 mg twice a day and I will consider starting him on amiodarone long-term to maintain sinus rhythm if he converts to sinus rhythm.  He has had baseline sinus bradycardia in the past.  Antihypertensive therapy with lisinopril HCTZ, antianginal therapy with isosorbide mononitrate and Ranexa, statin therapy with atorvastatin has been continued.  There is no myocardial injury at this time.  We will consider noninvasive testing to rule out progression of coronary artery disease as an outpatient.    All risks and benefits were explained to the patient in detail and he will be ASA II and Mallampati II.      Atrial flutter with rapid ventricular response          Pradeep Francis MD  04/11/23  08:03 CDT

## 2023-04-12 ENCOUNTER — TELEPHONE (OUTPATIENT)
Dept: CARDIOLOGY | Facility: CLINIC | Age: 73
End: 2023-04-12
Payer: MEDICARE

## 2023-04-12 VITALS
RESPIRATION RATE: 21 BRPM | OXYGEN SATURATION: 98 % | HEART RATE: 80 BPM | BODY MASS INDEX: 31.5 KG/M2 | DIASTOLIC BLOOD PRESSURE: 90 MMHG | SYSTOLIC BLOOD PRESSURE: 166 MMHG | TEMPERATURE: 98.3 F | WEIGHT: 220.02 LBS | HEIGHT: 70 IN

## 2023-04-12 DIAGNOSIS — I48.92 ATRIAL FLUTTER WITH RAPID VENTRICULAR RESPONSE: Primary | ICD-10-CM

## 2023-04-12 LAB
ANION GAP SERPL CALCULATED.3IONS-SCNC: 8 MMOL/L (ref 5–15)
BASOPHILS # BLD AUTO: NORMAL 10*3/UL
BASOPHILS NFR BLD AUTO: NORMAL %
BUN SERPL-MCNC: 17 MG/DL (ref 8–23)
BUN/CREAT SERPL: 18.9 (ref 7–25)
CALCIUM SPEC-SCNC: 8.6 MG/DL (ref 8.6–10.5)
CHLORIDE SERPL-SCNC: 111 MMOL/L (ref 98–107)
CO2 SERPL-SCNC: 22 MMOL/L (ref 22–29)
CREAT SERPL-MCNC: 0.9 MG/DL (ref 0.76–1.27)
EGFRCR SERPLBLD CKD-EPI 2021: 90.2 ML/MIN/1.73
EOSINOPHIL # BLD AUTO: NORMAL 10*3/UL
EOSINOPHIL NFR BLD AUTO: NORMAL %
ERYTHROCYTE [DISTWIDTH] IN BLOOD BY AUTOMATED COUNT: NORMAL %
GLUCOSE SERPL-MCNC: 110 MG/DL (ref 65–99)
HCT VFR BLD AUTO: NORMAL %
HGB BLD-MCNC: NORMAL G/DL
LYMPHOCYTES # BLD AUTO: NORMAL 10*3/UL
LYMPHOCYTES NFR BLD AUTO: NORMAL %
MCH RBC QN AUTO: NORMAL PG
MCHC RBC AUTO-ENTMCNC: NORMAL G/DL
MCV RBC AUTO: NORMAL FL
MONOCYTES # BLD AUTO: NORMAL 10*3/UL
MONOCYTES NFR BLD AUTO: NORMAL %
NEUTROPHILS NFR BLD AUTO: NORMAL %
NEUTROPHILS NFR BLD AUTO: NORMAL %
PLATELET # BLD AUTO: NORMAL 10*3/UL
POTASSIUM SERPL-SCNC: 3.7 MMOL/L (ref 3.5–5.2)
QT INTERVAL: 426 MS
QTC INTERVAL: 472 MS
RBC # BLD AUTO: NORMAL 10*6/UL
SODIUM SERPL-SCNC: 141 MMOL/L (ref 136–145)
WBC NRBC COR # BLD: NORMAL 10*3/UL

## 2023-04-12 PROCEDURE — 99233 SBSQ HOSP IP/OBS HIGH 50: CPT | Performed by: INTERNAL MEDICINE

## 2023-04-12 PROCEDURE — 85025 COMPLETE CBC W/AUTO DIFF WBC: CPT | Performed by: HOSPITALIST

## 2023-04-12 PROCEDURE — 93005 ELECTROCARDIOGRAM TRACING: CPT | Performed by: INTERNAL MEDICINE

## 2023-04-12 PROCEDURE — G0378 HOSPITAL OBSERVATION PER HR: HCPCS

## 2023-04-12 PROCEDURE — 80048 BASIC METABOLIC PNL TOTAL CA: CPT | Performed by: HOSPITALIST

## 2023-04-12 RX ORDER — AMIODARONE HYDROCHLORIDE 200 MG/1
TABLET ORAL
Qty: 37 TABLET | Refills: 0 | Status: SHIPPED | OUTPATIENT
Start: 2023-04-12 | End: 2023-05-12

## 2023-04-12 RX ADMIN — ATORVASTATIN CALCIUM 40 MG: 40 TABLET, FILM COATED ORAL at 08:07

## 2023-04-12 RX ADMIN — RANOLAZINE 500 MG: 500 TABLET, FILM COATED, EXTENDED RELEASE ORAL at 08:07

## 2023-04-12 RX ADMIN — ISOSORBIDE MONONITRATE 30 MG: 30 TABLET, EXTENDED RELEASE ORAL at 08:07

## 2023-04-12 RX ADMIN — APIXABAN 5 MG: 5 TABLET, FILM COATED ORAL at 08:07

## 2023-04-12 RX ADMIN — LISINOPRIL 20 MG: 20 TABLET ORAL at 08:07

## 2023-04-12 RX ADMIN — HYDROCHLOROTHIAZIDE 12.5 MG: 12.5 TABLET ORAL at 08:07

## 2023-04-12 RX ADMIN — CLOPIDOGREL BISULFATE 75 MG: 75 TABLET ORAL at 08:08

## 2023-04-12 RX ADMIN — Medication 500 MCG: at 08:07

## 2023-04-12 RX ADMIN — AMIODARONE HYDROCHLORIDE 200 MG: 200 TABLET ORAL at 08:08

## 2023-04-12 NOTE — PROGRESS NOTES
"[facility] Progress Note   LOS: 1 day   Patient Care Team:  Sanjay Orellana MD as PCP - General (Family Medicine)    Chief Complaint:    Chief Complaint   Patient presents with    Rapid Heart Rate        Subjective     Interval History:   Patient Denies: no complaints today  Patient Complaints: no complaints today  History taken from: patient chart family     No acute events overnight.  Blood pressure noted to be elevated today and did decrease after he received his morning meds.  He denies any symptoms such as shortness of breath, palpitations or chest pain.  He is in sinus rhythm.    Objective     Vital Sign Min/Max for last 24 hours  Temp  Min: 98.3 °F (36.8 °C)  Max: 98.5 °F (36.9 °C)   BP  Min: 116/71  Max: 182/99   Pulse  Min: 50  Max: 144   Resp  Min: 12  Max: 24   SpO2  Min: 93 %  Max: 100 %   No data recorded   Weight  Min: 99.8 kg (220 lb 0.3 oz)  Max: 99.8 kg (220 lb 0.3 oz)     Flowsheet Rows      Flowsheet Row First Filed Value   Admission Height 177.8 cm (70\") Documented at 04/10/2023 1235   Admission Weight 95.7 kg (211 lb) Documented at 04/10/2023 1235              04/10/23  1235 04/10/23  1803 04/11/23  1258   Weight: 95.7 kg (211 lb) 99.8 kg (220 lb 0.3 oz) 99.8 kg (220 lb 0.3 oz)       Physical Exam:  Physical Exam  Vitals and nursing note reviewed.   Constitutional:       General: He is not in acute distress.     Appearance: He is obese. He is not ill-appearing, toxic-appearing or diaphoretic.   HENT:      Head: Normocephalic.      Right Ear: External ear normal.      Left Ear: External ear normal.   Eyes:      General: Lids are normal.      Pupils: Pupils are equal, round, and reactive to light.   Neck:      Thyroid: No thyromegaly.      Vascular: No carotid bruit or JVD.      Trachea: No tracheal deviation.   Cardiovascular:      Rate and Rhythm: Normal rate and regular rhythm.      Heart sounds: Normal heart sounds. No murmur heard.    No friction rub. No gallop.   Pulmonary:      Effort: " Pulmonary effort is normal. No respiratory distress.      Breath sounds: Normal breath sounds. No stridor. No wheezing or rales.   Chest:      Chest wall: No tenderness.   Abdominal:      General: Bowel sounds are normal. There is no distension.      Palpations: Abdomen is soft.      Tenderness: There is no abdominal tenderness.   Skin:     General: Skin is warm and dry.      Findings: No erythema or rash.   Neurological:      Mental Status: He is alert and oriented to person, place, and time.      Cranial Nerves: No cranial nerve deficit.      Deep Tendon Reflexes: Reflexes are normal and symmetric. Reflexes normal.   Psychiatric:         Behavior: Behavior normal.         Thought Content: Thought content normal.         Judgment: Judgment normal.          Results Review:     Results from last 7 days   Lab Units 04/12/23  0602 04/11/23  0346 04/10/23  1325   SODIUM mmol/L 141 143 144   POTASSIUM mmol/L 3.7 3.8 4.0   CHLORIDE mmol/L 111* 111* 109*   CO2 mmol/L 22.0 24.0 25.0   BUN mg/dL 17 16 16   CREATININE mg/dL 0.90 0.91 1.01   CALCIUM mg/dL 8.6 8.3* 9.0   BILIRUBIN mg/dL  --   --  0.4   ALK PHOS U/L  --   --  49   ALT (SGPT) U/L  --   --  8   AST (SGOT) U/L  --   --  11   GLUCOSE mg/dL 110* 107* 114*       Estimated Creatinine Clearance: 86.5 mL/min (by C-G formula based on SCr of 0.9 mg/dL).    Results from last 7 days   Lab Units 04/10/23  1325   MAGNESIUM mg/dL 2.0                   Results from last 7 days   Lab Units 04/10/23  1325   INR  1.10     Lab Results   Component Value Date    PROBNP 1,482.0 (H) 04/10/2023       I/O last 3 completed shifts:  In: 3872.9 [P.O.:440; I.V.:3432.9]  Out: 2250 [Urine:2250]    Cardiographics:  ECG/EMG Results (last 24 hours)       Procedure Component Value Units Date/Time    SCANNED EKG [[id number]] Resulted: 04/10/23     Updated: 04/11/23 1346    ECG 12 Lead Rhythm Change [[id number]] Collected: 04/11/23 1256     Updated: 04/11/23 1349     QT Interval 382 ms      QTC  Interval 415 ms     Narrative:      Test Reason : Rhythm Change  Blood Pressure :   */*   mmHG  Vent. Rate :  71 BPM     Atrial Rate :  71 BPM     P-R Int : 186 ms          QRS Dur :  88 ms      QT Int : 382 ms       P-R-T Axes :   2  77  76 degrees     QTc Int : 415 ms    Sinus rhythm with Premature supraventricular complexes and with occasional Premature ventricular complexes  Cannot rule out Inferior infarct , age undetermined  Anteroseptal infarct (cited on or before 13-JAN-2021)  Abnormal ECG  When compared with ECG of 10-APR-2023 11:02,  Significant changes have occurred    Referred By:            Confirmed By:     ECG 12 Lead Rhythm Change [[id number]] Collected: 04/11/23 1446     Updated: 04/11/23 1524     QT Interval 464 ms      QTC Interval 464 ms     Narrative:      Test Reason : Rhythm Change  Blood Pressure :   */*   mmHG  Vent. Rate :  60 BPM     Atrial Rate :  60 BPM     P-R Int : 174 ms          QRS Dur :  94 ms      QT Int : 464 ms       P-R-T Axes :  11  68 101 degrees     QTc Int : 464 ms    Normal sinus rhythm  Anterior infarct (cited on or before 13-JAN-2021)  Abnormal ECG  When compared with ECG of 11-APR-2023 12:56,  Premature ventricular complexes are no longer Present  Premature supraventricular complexes are no longer Present  Nonspecific T wave abnormality no longer evident in Inferior leads    Referred By:            Confirmed By:     Adult Transesophageal Echo (SANJAY) W/ Cont if Necessary Per Protocol [[id number]] Resulted: 04/11/23 1910     Updated: 04/11/23 1915     Target HR (85%) 125 bpm      Max. Pred. HR (100%) 147 bpm     Narrative:      Storm Tellez is a 72-year-old male with coronary artery disease status   post CABG, hypertension, hyperlipidemia, atrial flutter/fibrillation with   RVR of unknown duration.  Transesophageal echocardiogram was recommended   to assess left ventricular and valvular function and to rule out   intracardiac thrombus prior to considering  cardioversion.    Patient was brought to the cardiac catheterization lab and conscious   sedation administered using 6 mg of Versed and 50 mg of fentanyl.    Posterior pharynx was anesthetized using Cetacaine and transesophageal   probe was advanced without problems.  Standard images were obtained.    Result: Overall LV systolic function was normal with an ejection fraction   of 55 to 60%.  No wall motion abnormalities noted.  No LV mass or thrombus   noted.  There is left ventricular hypertrophy which is concentric.  Right ventricle was borderline dilated.  Normal contractility was noted.  Left atrium was moderately dilated.  There was mild smoke noted.  Left   atrial appendage was intact with no evidence of thrombus.  Right atrium was moderately dilated.  Interatrial septum was intact.  No   PFO or ASD noted by bubble contrast studies.  Aortic root was normal in size.  Aortic valve is trileaflet.  No stenosis or regurgitation noted.  Mitral valve is structurally normal.  There was mild mitral regurgitation.  Tricuspid valve is structurally normal there was trace tricuspid   regurgitation.  No pericardial effusion noted.    Impression: Overall normal LV systolic function with an ejection fraction   of 55 to 60% with no wall motion abnormalities.  Moderate biatrial   enlargement.  No intracardiac mass or thrombus noted on the study.  No   significant valve abnormalities noted.      SCANNED EKG [[id number]] Resulted: 04/10/23     Updated: 04/11/23 2053    ECG 12 Lead Drug Monitoring; Patient on amiodarone.  Status post cardioversion [[id number]] Collected: 04/12/23 0738     Updated: 04/12/23 0739     QT Interval 426 ms      QTC Interval 472 ms     Narrative:      Test Reason : Drug Monitoring  Blood Pressure :   */*   mmHG  Vent. Rate :  74 BPM     Atrial Rate :  74 BPM     P-R Int : 170 ms          QRS Dur :  98 ms      QT Int : 426 ms       P-R-T Axes :  46  51  59 degrees     QTc Int : 472 ms    Normal sinus rhythm  with sinus arrhythmia  Anteroseptal infarct (cited on or before 13-JAN-2021)  Abnormal ECG  When compared with ECG of 11-APR-2023 14:46,  No significant change was found    Referred By:            Confirmed By:           Results for orders placed during the hospital encounter of 04/10/23    Adult Transesophageal Echo (SANJAY) W/ Cont if Necessary Per Protocol    Interpretation Summary  Storm Tellez is a 72-year-old male with coronary artery disease status post CABG, hypertension, hyperlipidemia, atrial flutter/fibrillation with RVR of unknown duration.  Transesophageal echocardiogram was recommended to assess left ventricular and valvular function and to rule out intracardiac thrombus prior to considering cardioversion.    Patient was brought to the cardiac catheterization lab and conscious sedation administered using 6 mg of Versed and 50 mg of fentanyl.  Posterior pharynx was anesthetized using Cetacaine and transesophageal probe was advanced without problems.  Standard images were obtained.    Result: Overall LV systolic function was normal with an ejection fraction of 55 to 60%.  No wall motion abnormalities noted.  No LV mass or thrombus noted.  There is left ventricular hypertrophy which is concentric.  Right ventricle was borderline dilated.  Normal contractility was noted.  Left atrium was moderately dilated.  There was mild smoke noted.  Left atrial appendage was intact with no evidence of thrombus.  Right atrium was moderately dilated.  Interatrial septum was intact.  No PFO or ASD noted by bubble contrast studies.  Aortic root was normal in size.  Aortic valve is trileaflet.  No stenosis or regurgitation noted.  Mitral valve is structurally normal.  There was mild mitral regurgitation.  Tricuspid valve is structurally normal there was trace tricuspid regurgitation.  No pericardial effusion noted.    Impression: Overall normal LV systolic function with an ejection fraction of 55 to 60% with no wall motion  abnormalities.  Moderate biatrial enlargement.  No intracardiac mass or thrombus noted on the study.  No significant valve abnormalities noted.      Imaging Results (Most Recent)       Procedure Component Value Units Date/Time    XR Chest 1 View [[id number]] Collected: 04/10/23 1150     Updated: 04/10/23 1303    Narrative:      FINDINGS:  The patient is post median sternotomy. The heart is enlarged with slight central  vascular congestion. No focal pulmonary consolidation or pleural effusion.      Impression:      Cardiomegaly.  No acute infiltrates.            XR Chest 1 View    Result Date: 4/10/2023  Cardiomegaly.  No acute infiltrates.      Medication Review:     Current Facility-Administered Medications:     amiodarone (PACERONE) tablet 200 mg, 200 mg, Oral, Q12H, Pradeep Francis MD, 200 mg at 04/12/23 0808    apixaban (ELIQUIS) tablet 5 mg, 5 mg, Oral, Q12H, Dante Roger MD, 5 mg at 04/12/23 0807    atorvastatin (LIPITOR) tablet 40 mg, 40 mg, Oral, Daily, Dante Roger MD, 40 mg at 04/12/23 0807    clopidogrel (PLAVIX) tablet 75 mg, 75 mg, Oral, Daily, Dante Roger MD, 75 mg at 04/12/23 0808    folic acid (FOLVITE) half tablet 500 mcg, 500 mcg, Oral, Daily, Dante Roger MD, 500 mcg at 04/12/23 0807    lisinopril (PRINIVIL,ZESTRIL) tablet 20 mg, 20 mg, Oral, Q24H, 20 mg at 04/12/23 0807 **AND** hydroCHLOROthiazide (HYDRODIURIL) tablet 12.5 mg, 12.5 mg, Oral, Q24H, Dante Roger MD, 12.5 mg at 04/12/23 0807    isosorbide mononitrate (IMDUR) 24 hr tablet 30 mg, 30 mg, Oral, Daily, Dante Roger MD, 30 mg at 04/12/23 0807    nitroglycerin (NITROSTAT) SL tablet 0.4 mg, 0.4 mg, Sublingual, Q5 Min PRN, Dante Roger MD    ranolazine (RANEXA) 12 hr tablet 500 mg, 500 mg, Oral, Q12H, Dante Roger MD, 500 mg at 04/12/23 0807    Assessment & Plan       Atrial flutter with rapid ventricular response    #1.  Atrial flutter with RVR: 2 to 3 months duration.  He was  initially started on IV Cardizem with no improvement.  He underwent SANJAY cardioversion.  With successful cardioversion to sinus rhythm with 1 synchronized biphasic shock at 200 J.  EKG post cardioversion showed sinus rhythm with PACs.  He was subsequently started on 150 mg IV amiodarone bolus and infusion.  He was transferred to ICU for further monitoring.  Amiodarone drip was discontinued and he was transitioned to oral amiodarone at 200 mg twice daily x7 days, followed by 200 mg daily.  He has a history of baseline sinus bradycardia.  He will also continue anticoagulation with Eliquis 5 mg twice daily.  -SANJAY showing normal EF, no wall motion abnormalities, no LV mass or thrombus, biatrial enlargement of moderate degree, no significant valvular abnormalities  -Discussed with patient the importance of wearing BiPAP    #2.  CAD status post CABG in 2005: With LIMA to LAD, SVG to distal RCA, SVG to diagonal and free YOUNG to obtuse marginal.  Denies angina.  Continue antianginal therapy with isosorbide mononitrate, Ranexa, Plavix.    #3.  Hypertension: Moderately elevated today however he did not receive his antihypertensives yesterday.  He will continue Imdur 30 mg, lisinopril 20 mg, HCTZ 12.5 mg      Plan for disposition:Where: home and When:  today Follow up with Dr. Francis in 1 month.               This document has been electronically signed by SHARMAINE Pate on April 12, 2023 09:09 CDT     Electronically signed by SHARMAINE Pate, 04/12/23, 9:09 AM CDT.    Patient was seen by me at 7:30 AM today.  Agree with assessment and plan as outlined by SHARMAINE Gamboa    Mr. Storm Tellez is a 72-year-old male who is status post coronary artery bypass surgery in 2005.  He had received LIMA to LAD, SVG to distal RCA, SVG to diagonal and free YOUNG to obtuse marginal coronary artery.      He was seen in office on 4/10/23 and noted to have markedly increased heart rate, which apparently has been present  for at least 2 to 3 months according to him.  He has had pain in the hip joint, back pain which has restricted his mobility but on direct questioning he does indicate some degree of dyspnea on exertion.  No PND or orthopnea is reported.  He denies any chest pain.  He has had issues with his CPAP but this appears to have been resolved now.  EKG confirmed atrial flutter with rapid ventricular response.  Patient underwent SANJAY/cardioversion yesterday and converted to sinus rhythm.  Started on antiarrhythmic therapy with amiodarone.    He has remained in sinus rhythm post cardioversion and denies any cardiac symptoms overnight.  His blood pressure was mildly elevated but improved after receiving antihypertensive therapy this morning.    Heart rate was 58 bpm, regular.  Blood pressure was 150/88 mmHg  There is no pallor icterus or cyanosis  Neck exam showed no jugular venous distention or carotid bruit  Exam of the heart showed normal first and second heart sounds with no S3, S4, pericardial rub or murmur  Lung exam showed normal breath sounds with no rales or rhonchi  Abdomen is soft with no mass or tenderness  Extremities showed no edema    Labs were reviewed and electrolytes showed a sodium of 141, potassium 3.7, chloride 111, CO2 22.  BUN was 17 and creatinine 1.9.    Agree with continuation of antiarrhythmic therapy with amiodarone 200 mg twice a day for 1 week followed by 200 mg daily.  He was initially started on Eliquis 5 mg twice a day but because of cost of the medication he has been switched to Xarelto 20 mg daily on discharge.  Antiplatelet therapy with Plavix, antianginal therapy with isosorbide mononitrate and Ranexa have been continued.  Antihypertensive therapy with lisinopril and HCTZ have been continued.  The patient and his family had several questions with regards to his medications and these were explained to him.  He has been advised to watch his heart rate closely.  He has baseline sinus  bradycardia.  They are aware of possibly needing a pacemaker in the future if he has symptomatic sinus bradycardia.  Ablation in the future has also been discussed, if he has any reoccurrence of arrhythmia.    Electronically signed by Pradeep Francis MD, 04/12/23, 5:08 PM CDT.

## 2023-04-12 NOTE — DISCHARGE SUMMARY
[facility] Medicine Services  DISCHARGE SUMMARY       Date of Admission: 4/10/2023  Date of Discharge:  4/12/2023  Primary Care Physician: Sanjay Orellana MD    Presenting Problem/History of Present Illness:  Atrial flutter with rapid ventricular response [I48.92]       Final Discharge Diagnoses:  Active Hospital Problems    Diagnosis    • **Atrial flutter with rapid ventricular response        Consults:   Consults     No orders found from 3/12/2023 to 4/11/2023.        Pertinent Test Results:   Lab Results (most recent)     Procedure Component Value Units Date/Time    CBC & Differential [[id number]] Collected: 04/12/23 0602    Specimen: Blood Updated: 04/12/23 1221    Narrative:      The following orders were created for panel order CBC & Differential.  Procedure                               Abnormality         Status                     ---------                               -----------         ------                     CBC Auto Differential[[id number]]                            Final result                 Please view results for these tests on the individual orders.    CBC Auto Differential [[id number]] Collected: 04/12/23 0602    Specimen: Blood Updated: 04/12/23 1221     WBC --     Comment: See scanned report          RBC --     Comment: See scanned report          Hemoglobin --     Comment: See scanned report          Hematocrit --     Comment: See scanned report          MCV --     Comment: See scanned report          MCH --     Comment: See scanned report          MCHC --     Comment: See scanned report          RDW --     Comment: See scanned report          Platelets --     Comment: See scanned report          Neutrophil % --     Lymphocyte % --     Monocyte % --     Eosinophil % --     Basophil % --     Neutrophils, Absolute --     Lymphocytes, Absolute --     Monocytes, Absolute --     Eosinophils, Absolute --     Basophils, Absolute --    Basic  Metabolic Panel [[id number]]  (Abnormal) Collected: 04/12/23 0602    Specimen: Blood Updated: 04/12/23 0659     Glucose 110 mg/dL      BUN 17 mg/dL      Creatinine 0.90 mg/dL      Sodium 141 mmol/L      Potassium 3.7 mmol/L      Chloride 111 mmol/L      CO2 22.0 mmol/L      Calcium 8.6 mg/dL      BUN/Creatinine Ratio 18.9     Anion Gap 8.0 mmol/L      eGFR 90.2 mL/min/1.73     Narrative:      GFR Normal >60  Chronic Kidney Disease <60  Kidney Failure <15    The GFR formula is only valid for adults with stable renal function between ages 18 and 70.    CBC & Differential [[id number]] Collected: 04/11/23 0346    Specimen: Blood Updated: 04/11/23 1442    Narrative:      The following orders were created for panel order CBC & Differential.  Procedure                               Abnormality         Status                     ---------                               -----------         ------                     CBC Auto Differential[[id number]]                            Final result                 Please view results for these tests on the individual orders.    CBC Auto Differential [[id number]] Collected: 04/11/23 0346    Specimen: Blood Updated: 04/11/23 1442     WBC --     Comment: See scanned report          RBC --     Comment: See scanned report          Hemoglobin --     Comment: See scanned report          Hematocrit --     Comment: See scanned report          MCV --     Comment: See scanned report          MCH --     Comment: See scanned report          MCHC --     Comment:   See scanned report          RDW --     Comment: See scanned report          Platelets --     Comment: See scanned report          Neutrophil % --     Lymphocyte % --     Monocyte % --     Eosinophil % --     Basophil % --     Neutrophils, Absolute --     Lymphocytes, Absolute --     Monocytes, Absolute --     Eosinophils, Absolute --     Basophils, Absolute --    Basic Metabolic Panel [[id number]]  (Abnormal) Collected: 04/11/23 0346     Specimen: Blood Updated: 04/11/23 0444     Glucose 107 mg/dL      BUN 16 mg/dL      Creatinine 0.91 mg/dL      Sodium 143 mmol/L      Potassium 3.8 mmol/L      Chloride 111 mmol/L      CO2 24.0 mmol/L      Calcium 8.3 mg/dL      BUN/Creatinine Ratio 17.6     Anion Gap 8.0 mmol/L      eGFR 89.0 mL/min/1.73     Narrative:      GFR Normal >60  Chronic Kidney Disease <60  Kidney Failure <15    The GFR formula is only valid for adults with stable renal function between ages 18 and 70.    High Sensitivity Troponin T 2Hr [[id number]]  (Normal) Collected: 04/10/23 1547    Specimen: Blood Updated: 04/10/23 1611     HS Troponin T 14 ng/L      Troponin T Delta -1 ng/L     Narrative:      High Sensitive Troponin T Reference Range:  <10.0 ng/L- Negative Female for AMI  <15.0 ng/L- Negative Male for AMI  >=10 - Abnormal Female indicating possible myocardial injury.  >=15 - Abnormal Male indicating possible myocardial injury.   Clinicians would have to utilize clinical acumen, EKG, Troponin, and serial changes to determine if it is an Acute Myocardial Infarction or myocardial injury due to an underlying chronic condition.         D-dimer, Quantitative [[id number]]  (Normal) Collected: 04/10/23 1325    Specimen: Blood Updated: 04/10/23 1412     D-Dimer, Quantitative 450 ng/mL (FEU)     Narrative:      According to the assay 's published package insert, a normal (<500 ng/mL (FEU)) D-dimer result in conjunction with a non-high clinical probability assessment, excludes deep vein thrombosis (DVT) and pulmonary embolism (PE) with high sensitivity.    D-dimer values increase with age and this can make VTE exclusion of an older population difficult. To address this, the [employer], based on best available evidence and recent guidelines, recommends that clinicians use age-adjusted D-dimer thresholds in patients greater than 50 years of age with: a) a low probability of PE who do not meet all Pulmonary Embolism  "Rule Out Criteria, or b) in those with intermediate probability of PE.   The formula for an age-adjusted D-dimer cut-off is \"age*10\".  For example, a 60 year old patient would have an age-adjusted cut-off of 600 ng/mL (FEU) and an 80 year old 800 ng/mL (FEU).      Protime-INR [[id number]]  (Normal) Collected: 04/10/23 1325    Specimen: Blood Updated: 04/10/23 1412     Protime 14.1 Seconds      INR 1.10    Narrative:      Therapeutic range for most indications is 2.0-3.0 INR,  or 2.5-3.5 for mechanical heart valves.    aPTT [[id number]]  (Normal) Collected: 04/10/23 1325    Specimen: Blood Updated: 04/10/23 1412     PTT 29.2 seconds     Narrative:      The recommended Heparin therapeutic range is 68-97 seconds.    High Sensitivity Troponin T [[id number]]  (Abnormal) Collected: 04/10/23 1325    Specimen: Blood Updated: 04/10/23 1405     HS Troponin T 15 ng/L     Narrative:      High Sensitive Troponin T Reference Range:  <10.0 ng/L- Negative Female for AMI  <15.0 ng/L- Negative Male for AMI  >=10 - Abnormal Female indicating possible myocardial injury.  >=15 - Abnormal Male indicating possible myocardial injury.   Clinicians would have to utilize clinical acumen, EKG, Troponin, and serial changes to determine if it is an Acute Myocardial Infarction or myocardial injury due to an underlying chronic condition.         BNP [[id number]]  (Abnormal) Collected: 04/10/23 1325    Specimen: Blood Updated: 04/10/23 1405     proBNP 1,482.0 pg/mL     Narrative:      Among patients with dyspnea, NT-proBNP is highly sensitive for the detection of acute congestive heart failure. In addition NT-proBNP of <300 pg/ml effectively rules out acute congestive heart failure with 99% negative predictive value.    Results may be falsely decreased if patient taking Biotin.      TSH+Free T4 [[id number]]  (Normal) Collected: 04/10/23 1325    Specimen: Blood Updated: 04/10/23 1405     TSH 0.994 uIU/mL      Free T4 1.02 ng/dL      Comment: T4 results " may be falsely increased if patient taking Biotin.       Magnesium [[id number]]  (Normal) Collected: 04/10/23 1325    Specimen: Blood Updated: 04/10/23 1352     Magnesium 2.0 mg/dL     Comprehensive Metabolic Panel [[id number]]  (Abnormal) Collected: 04/10/23 1325    Specimen: Blood Updated: 04/10/23 1352     Glucose 114 mg/dL      BUN 16 mg/dL      Creatinine 1.01 mg/dL      Sodium 144 mmol/L      Potassium 4.0 mmol/L      Chloride 109 mmol/L      CO2 25.0 mmol/L      Calcium 9.0 mg/dL      Total Protein 5.7 g/dL      Albumin 3.6 g/dL      ALT (SGPT) 8 U/L      AST (SGOT) 11 U/L      Alkaline Phosphatase 49 U/L      Total Bilirubin 0.4 mg/dL      Globulin 2.1 gm/dL      A/G Ratio 1.7 g/dL      BUN/Creatinine Ratio 15.8     Anion Gap 10.0 mmol/L      eGFR 78.5 mL/min/1.73     Narrative:      GFR Normal >60  Chronic Kidney Disease <60  Kidney Failure <15    The GFR formula is only valid for adults with stable renal function between ages 18 and 70.    Blood Gas, Venous - [[id number]]  (Abnormal) Collected: 04/10/23 1325    Specimen: Venous Blood Updated: 04/10/23 1326     Site Venous     pH, Venous 7.389 pH Units      pCO2, Venous 43.7 mm Hg      pO2, Venous 58.3 mm Hg      HCO3, Venous 26.3 mmol/L      Base Excess, Venous 1.0 mmol/L      O2 Saturation, Venous 90.4 %      Hemoglobin, Blood Gas 13.5 g/dL      Barometric Pressure for Blood Gas --     Comment: PATM not performed at this facility.        Modality N/A    Narrative:      HCT=41.2%        Imaging Results (Most Recent)     Procedure Component Value Units Date/Time    XR Chest 1 View [[id number]] Collected: 04/10/23 1150     Updated: 04/10/23 1303    Narrative:      FINDINGS:  The patient is post median sternotomy. The heart is enlarged with slight central  vascular congestion. No focal pulmonary consolidation or pleural effusion.      Impression:      Cardiomegaly.  No acute infiltrates.          Chief Complaint on Day of Discharge: None    Hospital Course:  The  "patient is a 72 y.o. male who presented to [facility] with palpitations.  He was diagnosed with atrial flutter with rapid ventricular spots.  This is new diagnosis for him.  He was started on Cardizem infusion which helped rate controlling.  Cardiology was consulted and plan was made for SANJAY cardioversion.  Patient was started on Eliquis at the time of admission.  SANJYA and cardioversion were done patient transition back into normal sinus rhythm.  He was discharged home in good condition on amiodarone twice daily for 1 week and then daily.  We will continue Eliquis.  He will follow-up with PCP in a week and with cardiology in a month.    Condition on Discharge: Hemodynamically stable    Physical Exam on Discharge:  /90 (BP Location: Left arm, Patient Position: Lying)   Pulse 80   Temp 98.3 °F (36.8 °C) (Oral)   Resp 21   Ht 177.8 cm (70\")   Wt 99.8 kg (220 lb 0.3 oz)   SpO2 98%   BMI 31.57 kg/m²      Physical Exam  Vitals reviewed.   Constitutional:       Appearance: He is well-developed.   HENT:      Head: Normocephalic and atraumatic.   Eyes:      Pupils: Pupils are equal, round, and reactive to light.   Cardiovascular:      Rate and Rhythm: Normal rate and regular rhythm.      Heart sounds: Normal heart sounds. No murmur heard.    No friction rub. No gallop.   Pulmonary:      Effort: Pulmonary effort is normal. No respiratory distress.      Breath sounds: Normal breath sounds. No wheezing or rales.   Chest:      Chest wall: No tenderness.   Abdominal:      General: Bowel sounds are normal. There is no distension.      Palpations: Abdomen is soft.      Tenderness: There is no abdominal tenderness.   Musculoskeletal:      Cervical back: Normal range of motion and neck supple.   Psychiatric:         Behavior: Behavior normal.           Discharge Disposition:  Home or Self Care    Discharge Medications:     Discharge Medications      New Medications      Instructions Start Date "   amiodarone 200 MG tablet  Commonly known as: PACERONE   Take 1 tablet by mouth Every 12 (Twelve) Hours for 7 days, THEN 1 tablet Daily for 23 days.   Start Date: April 12, 2023     apixaban 5 MG tablet tablet  Commonly known as: ELIQUIS   5 mg, Oral, Every 12 Hours Scheduled         Continue These Medications      Instructions Start Date   atorvastatin 40 MG tablet  Commonly known as: LIPITOR   40 mg, Oral, Daily      clopidogrel 75 MG tablet  Commonly known as: PLAVIX   75 mg, Oral, Daily      folic acid 400 MCG tablet  Commonly known as: FOLVITE   400 mcg, Oral, Daily      isosorbide mononitrate 30 MG 24 hr tablet  Commonly known as: IMDUR   30 mg, Oral, Daily      nitroglycerin 0.4 MG SL tablet  Commonly known as: NITROSTAT   0.4 mg, Sublingual, Every 5 Minutes PRN, Take no more than 3 doses in 15 minutes.       ranolazine 500 MG 12 hr tablet  Commonly known as: RANEXA   TAKE ONE TABLET BY MOUTH EVERY 12 HOURS         Stop These Medications    aspirin  MG tablet        ASK your doctor about these medications      Instructions Start Date   lisinopril-hydrochlorothiazide 20-12.5 MG per tablet  Commonly known as: PRINZIDE,ZESTORETIC   1 tablet, Oral, Daily             Discharge Diet:   Diet Instructions     Diet: Cardiac Diets, Diabetic Diets; Healthy Heart (2-3 Na+); Regular Texture (IDDSI 7); Thin (IDDSI 0); Consistent Carbohydrate      Discharge Diet:  Cardiac Diets  Diabetic Diets       Cardiac Diet: Healthy Heart (2-3 Na+)    Texture: Regular Texture (IDDSI 7)    Fluid Consistency: Thin (IDDSI 0)    Diabetic Diet: Consistent Carbohydrate          Activity at Discharge:   Activity Instructions     Activity as Tolerated            Follow-up Appointments:   Future Appointments   Date Time Provider Department Center   5/26/2023 11:00 AM Pradeep Francis MD [facility] None   1/19/2024  8:30 AM Rachell Dubose APRN [facility]  [facility]               This document has been electronically signed by Dante  AMANDA Roger MD on April 12, 2023 16:04 CDT      Time: 35 minutes

## 2023-04-12 NOTE — PROGRESS NOTES
[facility] Medicine Services  INPATIENT PROGRESS NOTE    Length of Stay: 1  Date of Admission: 4/10/2023  Primary Care Physician: Sanjay Orellana MD    Subjective   Chief Complaint: Palpitations  HPI: Patient states that he is feeling better today.  No specific complaints.  Anxious to get SANJAY and cardioversion done.    As of today, 04/11/23  Review of Systems   Constitutional: Negative for appetite change, chills, fatigue and fever.   Respiratory: Positive for shortness of breath. Negative for chest tightness.    Cardiovascular: Positive for palpitations. Negative for chest pain and leg swelling.   Gastrointestinal: Negative for abdominal pain, constipation, diarrhea, nausea and vomiting.   Skin: Negative for wound.   Neurological: Negative for dizziness, weakness, light-headedness, numbness and headaches.        All pertinent negatives and positives are as above. All other systems have been reviewed and are negative unless otherwise stated.    Objective    Temp:  [98.4 °F (36.9 °C)-98.7 °F (37.1 °C)] 98.7 °F (37.1 °C)  Heart Rate:  [] 81  Resp:  [12-24] 23  BP: (116-166)/(61-99) 145/68         As of today, 04/11/23  Physical Exam  Vitals reviewed.   Constitutional:       Appearance: He is well-developed.   HENT:      Head: Normocephalic and atraumatic.   Eyes:      Pupils: Pupils are equal, round, and reactive to light.   Cardiovascular:      Rate and Rhythm: Normal rate. Rhythm irregularly irregular.      Heart sounds: Normal heart sounds. No murmur heard.    No friction rub. No gallop.   Pulmonary:      Effort: Pulmonary effort is normal. No respiratory distress.      Breath sounds: Normal breath sounds. No wheezing or rales.   Chest:      Chest wall: No tenderness.   Abdominal:      General: Bowel sounds are normal. There is no distension.      Palpations: Abdomen is soft.      Tenderness: There is no abdominal tenderness.   Musculoskeletal:      Cervical  back: Normal range of motion and neck supple.   Psychiatric:         Behavior: Behavior normal.           Results Review:  I have reviewed the labs, radiology results, and diagnostic studies.    Laboratory Data:   Results from last 7 days   Lab Units 04/11/23  0346 04/10/23  1325   SODIUM mmol/L 143 144   POTASSIUM mmol/L 3.8 4.0   CHLORIDE mmol/L 111* 109*   CO2 mmol/L 24.0 25.0   BUN mg/dL 16 16   CREATININE mg/dL 0.91 1.01   GLUCOSE mg/dL 107* 114*   CALCIUM mg/dL 8.3* 9.0   BILIRUBIN mg/dL  --  0.4   ALK PHOS U/L  --  49   ALT (SGPT) U/L  --  8   AST (SGOT) U/L  --  11   ANION GAP mmol/L 8.0 10.0     Estimated Creatinine Clearance: 85.6 mL/min (by C-G formula based on SCr of 0.91 mg/dL).  Results from last 7 days   Lab Units 04/10/23  1325   MAGNESIUM mg/dL 2.0             Results from last 7 days   Lab Units 04/10/23  1325   INR  1.10       Culture Data:   No results found for: BLOODCX  No results found for: URINECX  No results found for: RESPCX  No results found for: WOUNDCX  No results found for: STOOLCX  No components found for: BODYFLD    Radiology Data:   Imaging Results (Last 24 Hours)     ** No results found for the last 24 hours. **          I have utilized all available immediate resources to obtain, update, or review the patient's current medications (including all prescriptions, over-the-counter products, herbals, cannabis/cannabidiol products, and vitamin/mineral/dietary (nutritional) supplements).       Assessment/Plan     Active Hospital Problems    Diagnosis    • **Atrial flutter with rapid ventricular response        Plan:    1.  Atrial flutter with rapid ventricular response: Remains on Cardizem infusion.  Tolerating Eliquis.  Plan SANJAY and cardioversion today.  2.  Coronary artery disease: Continue home medication.  3.  Obstructive sleep apnea: Home CPAP.  4.  Hypertension: Continue home medication.  5.  DVT prophylaxis: Eliquis    Medical Decision Making  Number and Complexity of problems: 1  highly complex medical problem    Conditions and Status:        Condition is improving.     I spent 31 minutes providing critical care management to this patient.  This excludes time spent in performing separately billed procedures.      Discussed with: Patient and spouse     Treatment Plan  As above    Care Planning  Shared decision making: Patient in agreement with plan of care  Code status and discussions: Full code    Disposition  Social Determinants of Health that impact treatment or disposition: None  I expect the patient to be discharged to home in 1-2 days.       The patient was evaluated during the global COVID-19 pandemic, and the diagnosis was suspected/considered upon their initial presentation.  Evaluation, treatment, and testing were consistent with current guidelines for patients who present with complaints or symptoms that may be related to COVID-19.    I confirmed that the patient's Advance Care Plan is present, code status is documented, or surrogate decision maker is listed in the patient's medical record.        This document has been electronically signed by Dante Roger MD on April 11, 2023 19:06 CDT

## 2023-04-12 NOTE — TELEPHONE ENCOUNTER
Called pt no answer left vm to call the office back. His samples are at the ----- Message from SHARMAINE Pate sent at 4/12/2023 12:39 PM CDT -----  Please leave samples of Xarelto 20mg at desk for 6 weeks. When sees Brandt in 1 month can further discuss affordability of blood thinner. Can  you give him patient assistance paperwork too?

## 2023-04-20 ENCOUNTER — TELEPHONE (OUTPATIENT)
Dept: CARDIOLOGY | Facility: CLINIC | Age: 73
End: 2023-04-20
Payer: MEDICARE

## 2023-04-20 NOTE — TELEPHONE ENCOUNTER
Notified and understood----- Message from Pradeep Francis MD sent at 2023  6:11 PM CDT -----  Regarding: FW: bradley cordoba  Contact: [phone]  Let him take amiodarone 200 mg once a day until he sees me.  If he has any symptoms with slow heart rate he should let us know.  Heart rate in the 50s are acceptable.  ----- Message -----  From: Hallie Garcia MA  Sent: 2023   4:50 PM CDT  To: Pradeep Francis MD  Subject: RE: bradley phuongmaritza                                    Spoke with pt. His hr is staying in the mid 50's but a couple of nights mid 40's. He was taking two but started taking only one and still is ranging the same wanted to know if that's ok or you want to make some changes.   ----- Message -----  From: Sophie Herring RegSched Rep  Sent: 2023   8:34 AM CDT  To: Hallei Garcia MA  Subject: bradley cordoba                                        Storm Tellez  [birth year] wants call back @ [phone] to talk about meds.

## 2023-05-01 RX ORDER — ATORVASTATIN CALCIUM 40 MG/1
40 TABLET, FILM COATED ORAL DAILY
Qty: 90 TABLET | Refills: 3 | Status: SHIPPED | OUTPATIENT
Start: 2023-05-01 | End: 2023-05-01 | Stop reason: SDUPTHER

## 2023-05-01 RX ORDER — ATORVASTATIN CALCIUM 40 MG/1
40 TABLET, FILM COATED ORAL DAILY
Qty: 90 TABLET | Refills: 3 | Status: SHIPPED | OUTPATIENT
Start: 2023-05-01

## 2023-05-05 LAB
QT INTERVAL: 270 MS
QT INTERVAL: 382 MS
QT INTERVAL: 426 MS
QT INTERVAL: 464 MS
QTC INTERVAL: 412 MS
QTC INTERVAL: 415 MS
QTC INTERVAL: 464 MS
QTC INTERVAL: 472 MS

## 2023-05-10 ENCOUNTER — TELEPHONE (OUTPATIENT)
Dept: CARDIOLOGY | Facility: CLINIC | Age: 73
End: 2023-05-10
Payer: MEDICARE

## 2023-05-10 RX ORDER — AMIODARONE HYDROCHLORIDE 200 MG/1
TABLET ORAL
Qty: 37 TABLET | Refills: 0 | Status: SHIPPED | OUTPATIENT
Start: 2023-05-10 | End: 2023-06-09

## 2023-05-10 NOTE — TELEPHONE ENCOUNTER
Send to pt pharmacy ----- Message from Asuncion Ragland sent at 5/10/2023  8:06 AM CDT -----  Regarding: refill  Contact: [phone]  He was in the hospital a month ago and was prescribed Eliquis 5 MG and Amiodarone 200 MG. He said if Dr. Carlton wants him to continue taking these meds he will need refills sent to [facility] in [place]. Callback number is [phone] if you need to talk to him. Thanks

## 2023-06-16 ENCOUNTER — TELEPHONE (OUTPATIENT)
Dept: CARDIOLOGY | Facility: CLINIC | Age: 73
End: 2023-06-16
Payer: MEDICARE

## 2023-06-16 NOTE — TELEPHONE ENCOUNTER
No openings told pt if he needed to go to ER please do if hes having chest pain and if HR----- Message from Asuncion Ragland sent at 6/14/2023  8:21 AM CDT -----  Regarding: callback  Contact: [phone]  He has an appt with Brandt on 6/20 but his HR has been extremely low and he would like to discuss if he should continue his current meds or if he needs something different. Callback number is [phone]. Thanks

## 2023-07-12 PROBLEM — I48.0 PAROXYSMAL ATRIAL FIBRILLATION: Status: ACTIVE | Noted: 2023-07-12

## 2023-07-12 PROBLEM — I48.0 PAROXYSMAL ATRIAL FIBRILLATION: Status: RESOLVED | Noted: 2023-07-12 | Resolved: 2023-07-12

## 2023-08-06 ENCOUNTER — HOSPITAL ENCOUNTER (EMERGENCY)
Facility: HOSPITAL | Age: 73
Discharge: HOME | End: 2023-08-06
Attending: FAMILY MEDICINE | Admitting: FAMILY MEDICINE
Payer: MEDICARE

## 2023-08-06 ENCOUNTER — APPOINTMENT (OUTPATIENT)
Dept: GENERAL RADIOLOGY | Facility: HOSPITAL | Age: 73
End: 2023-08-06
Payer: MEDICARE

## 2023-08-06 VITALS
TEMPERATURE: 98.1 F | DIASTOLIC BLOOD PRESSURE: 76 MMHG | HEART RATE: 56 BPM | BODY MASS INDEX: 31.84 KG/M2 | RESPIRATION RATE: 18 BRPM | OXYGEN SATURATION: 96 % | HEIGHT: 69 IN | WEIGHT: 215 LBS | SYSTOLIC BLOOD PRESSURE: 152 MMHG

## 2023-08-06 DIAGNOSIS — I49.5 SICK SINUS SYNDROME: Primary | ICD-10-CM

## 2023-08-06 LAB
ALBUMIN SERPL-MCNC: 3.7 G/DL (ref 3.5–5.2)
ALBUMIN/GLOB SERPL: 1.4 G/DL
ALP SERPL-CCNC: 51 U/L (ref 39–117)
ALT SERPL W P-5'-P-CCNC: 8 U/L (ref 1–41)
ANION GAP SERPL CALCULATED.3IONS-SCNC: 9 MMOL/L (ref 5–15)
AST SERPL-CCNC: 11 U/L (ref 1–40)
BASOPHILS # BLD AUTO: 0.07 10*3/MM3 (ref 0–0.2)
BASOPHILS NFR BLD AUTO: 1.1 % (ref 0–1.5)
BILIRUB SERPL-MCNC: 0.6 MG/DL (ref 0–1.2)
BUN SERPL-MCNC: 16 MG/DL (ref 8–23)
BUN/CREAT SERPL: 14 (ref 7–25)
CALCIUM SPEC-SCNC: 9.3 MG/DL (ref 8.6–10.5)
CHLORIDE SERPL-SCNC: 106 MMOL/L (ref 98–107)
CO2 SERPL-SCNC: 27 MMOL/L (ref 22–29)
CREAT SERPL-MCNC: 1.14 MG/DL (ref 0.76–1.27)
DEPRECATED RDW RBC AUTO: 43.9 FL (ref 37–54)
EGFRCR SERPLBLD CKD-EPI 2021: 67.9 ML/MIN/1.73
EOSINOPHIL # BLD AUTO: 0.27 10*3/MM3 (ref 0–0.4)
EOSINOPHIL NFR BLD AUTO: 4.1 % (ref 0.3–6.2)
ERYTHROCYTE [DISTWIDTH] IN BLOOD BY AUTOMATED COUNT: 12.8 % (ref 12.3–15.4)
GLOBULIN UR ELPH-MCNC: 2.7 GM/DL
GLUCOSE SERPL-MCNC: 157 MG/DL (ref 65–99)
HCT VFR BLD AUTO: 43.3 % (ref 37.5–51)
HGB BLD-MCNC: 15.1 G/DL (ref 13–17.7)
HOLD SPECIMEN: NORMAL
IMM GRANULOCYTES # BLD AUTO: 0.02 10*3/MM3 (ref 0–0.05)
IMM GRANULOCYTES NFR BLD AUTO: 0.3 % (ref 0–0.5)
LYMPHOCYTES # BLD AUTO: 1.47 10*3/MM3 (ref 0.7–3.1)
LYMPHOCYTES NFR BLD AUTO: 22.3 % (ref 19.6–45.3)
MCH RBC QN AUTO: 32.9 PG (ref 26.6–33)
MCHC RBC AUTO-ENTMCNC: 34.9 G/DL (ref 31.5–35.7)
MCV RBC AUTO: 94.3 FL (ref 79–97)
MONOCYTES # BLD AUTO: 0.68 10*3/MM3 (ref 0.1–0.9)
MONOCYTES NFR BLD AUTO: 10.3 % (ref 5–12)
NEUTROPHILS NFR BLD AUTO: 4.09 10*3/MM3 (ref 1.7–7)
NEUTROPHILS NFR BLD AUTO: 61.9 % (ref 42.7–76)
NRBC BLD AUTO-RTO: 0 /100 WBC (ref 0–0.2)
NT-PROBNP SERPL-MCNC: 1288 PG/ML (ref 0–900)
PLATELET # BLD AUTO: 199 10*3/MM3 (ref 140–450)
PMV BLD AUTO: 10.8 FL (ref 6–12)
POTASSIUM SERPL-SCNC: 4 MMOL/L (ref 3.5–5.2)
PROT SERPL-MCNC: 6.4 G/DL (ref 6–8.5)
QT INTERVAL: 412 MS
QTC INTERVAL: 451 MS
RBC # BLD AUTO: 4.59 10*6/MM3 (ref 4.14–5.8)
SODIUM SERPL-SCNC: 142 MMOL/L (ref 136–145)
TROPONIN T SERPL HS-MCNC: 13 NG/L
WBC NRBC COR # BLD: 6.6 10*3/MM3 (ref 3.4–10.8)
WHOLE BLOOD HOLD COAG: NORMAL
WHOLE BLOOD HOLD SPECIMEN: NORMAL

## 2023-08-06 PROCEDURE — 99284 EMERGENCY DEPT VISIT MOD MDM: CPT

## 2023-08-06 PROCEDURE — 93005 ELECTROCARDIOGRAM TRACING: CPT

## 2023-08-06 PROCEDURE — 84484 ASSAY OF TROPONIN QUANT: CPT

## 2023-08-06 PROCEDURE — 85025 COMPLETE CBC W/AUTO DIFF WBC: CPT

## 2023-08-06 PROCEDURE — 71045 X-RAY EXAM CHEST 1 VIEW: CPT

## 2023-08-06 PROCEDURE — 80053 COMPREHEN METABOLIC PANEL: CPT

## 2023-08-06 PROCEDURE — 93005 ELECTROCARDIOGRAM TRACING: CPT | Performed by: FAMILY MEDICINE

## 2023-08-06 PROCEDURE — 83880 ASSAY OF NATRIURETIC PEPTIDE: CPT

## 2023-08-06 RX ORDER — SODIUM CHLORIDE 0.9 % (FLUSH) 0.9 %
10 SYRINGE (ML) INJECTION AS NEEDED
Status: DISCONTINUED | OUTPATIENT
Start: 2023-08-06 | End: 2023-08-06 | Stop reason: HOSPADM

## 2023-08-06 NOTE — ED PROVIDER NOTES
Subjective   History of Present Illness  The patient is a 72 years old with past medical history of coronary artery disease, hypertension, A-fib who presented here today because of elevated heart rate and palpitation at home.  Patient said that the heart rate was in 120s to 130s.  On arrival to the emergency room heart rate was in 50s.  Patient denies any chest pain, shortness of breath or dizziness.  Denies any fever chills or sweating.      Review of Systems   Cardiovascular:  Positive for palpitations.   All other systems reviewed and are negative.    Past Medical History:   Diagnosis Date    Bradycardia     Cancer     skin    Coronary atherosclerosis of native coronary artery     Dizziness     Elevated cholesterol     Hypertension     Presence of aortocoronary bypass graft     Sleep apnea     SOB (shortness of breath)        No Known Allergies    Past Surgical History:   Procedure Laterality Date    CARDIAC CATHETERIZATION      COLONOSCOPY  05/23/2011    Per Rogelio Rogers M.D.    COLONOSCOPY N/A 8/1/2022    Procedure: COLONOSCOPY;  Surgeon: Otoniel Kenney MD;  Location: [facility] ENDOSCOPY;  Service: Gastroenterology;  Laterality: N/A;    CORONARY ARTERY BYPASS GRAFT      HERNIA REPAIR      SHOULDER SURGERY         Family History   Problem Relation Age of Onset    Heart disease Mother     Heart disease Father     Hypertension Other        Social History     Socioeconomic History    Marital status:    Tobacco Use    Smoking status: Former    Smokeless tobacco: Never    Tobacco comments:     06/23/2022 Patient states he utilized 0.5- 1 pack of Cigarettes per day x 10- 15 years 40 years prior.   Substance and Sexual Activity    Alcohol use: No    Drug use: No    Sexual activity: Defer           Objective   Physical Exam  Vitals and nursing note reviewed.   Constitutional:       Appearance: Normal appearance. He is obese.   HENT:      Head: Normocephalic and atraumatic.      Right Ear: Tympanic  membrane, ear canal and external ear normal.      Left Ear: Tympanic membrane, ear canal and external ear normal.      Nose: Nose normal.   Eyes:      Extraocular Movements: Extraocular movements intact.      Conjunctiva/sclera: Conjunctivae normal.      Pupils: Pupils are equal, round, and reactive to light.   Cardiovascular:      Rate and Rhythm: Normal rate and regular rhythm.      Pulses: Normal pulses.      Heart sounds: Normal heart sounds.   Pulmonary:      Effort: Pulmonary effort is normal.      Breath sounds: Normal breath sounds.   Abdominal:      General: Abdomen is flat. Bowel sounds are normal.      Palpations: Abdomen is soft.   Musculoskeletal:         General: Normal range of motion.      Cervical back: Normal range of motion and neck supple.   Skin:     Capillary Refill: Capillary refill takes less than 2 seconds.   Neurological:      General: No focal deficit present.      Mental Status: He is alert and oriented to person, place, and time.       ECG 12 Lead      Date/Time: 8/6/2023 11:41 AM  Performed by: John Fonseca MD  Authorized by: John Fonseca MD   Interpreted by physician  Rhythm: sinus rhythm  Rate: normal  BPM: 72  Q waves: II, III, aVF, V3 and V2  Clinical impression: abnormal ECG and non-specific ECG             ED Course                Labs Reviewed   COMPREHENSIVE METABOLIC PANEL - Abnormal; Notable for the following components:       Result Value    Glucose 157 (*)     All other components within normal limits    Narrative:     GFR Normal >60  Chronic Kidney Disease <60  Kidney Failure <15    The GFR formula is only valid for adults with stable renal function between ages 18 and 70.   BNP (IN-HOUSE) - Abnormal; Notable for the following components:    proBNP 1,288.0 (*)     All other components within normal limits    Narrative:     Among patients with dyspnea, NT-proBNP is highly sensitive for the detection of acute congestive heart failure. In addition NT-proBNP  of <300 pg/ml effectively rules out acute congestive heart failure with 99% negative predictive value.     TROPONIN - Normal    Narrative:     High Sensitive Troponin T Reference Range:  <10.0 ng/L- Negative Female for AMI  <15.0 ng/L- Negative Male for AMI  >=10 - Abnormal Female indicating possible myocardial injury.  >=15 - Abnormal Male indicating possible myocardial injury.   Clinicians would have to utilize clinical acumen, EKG, Troponin, and serial changes to determine if it is an Acute Myocardial Infarction or myocardial injury due to an underlying chronic condition.        CBC WITH AUTO DIFFERENTIAL - Normal   RAINBOW DRAW    Narrative:     The following orders were created for panel order [place] Draw.  Procedure                               Abnormality         Status                     ---------                               -----------         ------                     Green Top (Gel)[[id number]]                                  Final result               Lavender Top[[id number]]                                     Final result               Gold Top - SST[[id number]]                                   Final result               Light Blue Top[[id number]]                                   Final result                 Please view results for these tests on the individual orders.   HIGH SENSITIVITIY TROPONIN T 2HR   CBC AND DIFFERENTIAL    Narrative:     The following orders were created for panel order CBC & Differential.  Procedure                               Abnormality         Status                     ---------                               -----------         ------                     CBC Auto Differential[[id number]]        Normal              Final result                 Please view results for these tests on the individual orders.   GREEN TOP   LAVENDER TOP   GOLD TOP - SST   LIGHT BLUE TOP   EXTRA TUBES    Narrative:     The following orders were created for panel order Extra Tubes.  Procedure                                Abnormality         Status                     ---------                               -----------         ------                     [place]calves Top[[id number]]                                         In process                   Please view results for these tests on the individual orders.   GRAY TOP       XR Chest 1 View   Final Result      Questionable consolidation in the left lung base which could represent   atelectasis or infection or effusion. Alternatively this could represent   prominent pericardial fat                                    Medical Decision Making  Patient is a 72 years old with past medical hypertension, A-fib who presented here today because of palpitation.  CBC, CMP, troponin was done down normal.  BNP is 1000.  Patient is feeling much better.  Patient was discharged home to follow up with Dr. Carlton.    Problems Addressed:  Sick sinus syndrome: complicated acute illness or injury    Amount and/or Complexity of Data Reviewed  Labs: ordered.  Radiology: ordered.  ECG/medicine tests: ordered and independent interpretation performed.    Risk  Prescription drug management.        Final diagnoses:   Sick sinus syndrome       ED Disposition  ED Disposition       ED Disposition   Discharge    Condition   Stable    Comment   --               Pradeep Francis MD  [address] [place] [address] [address]  [address] [facility] [address]  [phone]    In 3 days           Medication List        Changed      lisinopril-hydrochlorothiazide 20-12.5 MG per tablet  Commonly known as: PRINZIDE,ZESTORETIC  Take 1 tablet by mouth Daily.  What changed: additional instructions                 John Fonseca MD  08/06/23 1207       John Fonseca MD  08/06/23 1209

## 2023-08-06 NOTE — DISCHARGE INSTRUCTIONS
Results were discussed with patient.  Advised to follow-up with Dr. cabrera in 2 to 3 days.  Come back to the ER symptoms get worse.

## 2023-08-07 RX ORDER — ISOSORBIDE MONONITRATE 30 MG/1
30 TABLET, EXTENDED RELEASE ORAL DAILY
Qty: 90 TABLET | Refills: 3 | Status: SHIPPED | OUTPATIENT
Start: 2023-08-07

## 2023-08-14 ENCOUNTER — OFFICE VISIT (OUTPATIENT)
Dept: CARDIOLOGY | Facility: CLINIC | Age: 73
End: 2023-08-14
Payer: MEDICARE

## 2023-08-14 VITALS
DIASTOLIC BLOOD PRESSURE: 80 MMHG | BODY MASS INDEX: 32.33 KG/M2 | OXYGEN SATURATION: 98 % | HEIGHT: 69 IN | SYSTOLIC BLOOD PRESSURE: 154 MMHG | WEIGHT: 218.3 LBS | HEART RATE: 53 BPM | TEMPERATURE: 98.4 F

## 2023-08-14 DIAGNOSIS — I48.0 PAROXYSMAL ATRIAL FIBRILLATION: ICD-10-CM

## 2023-08-14 DIAGNOSIS — I48.92 ATRIAL FLUTTER WITH RAPID VENTRICULAR RESPONSE: ICD-10-CM

## 2023-08-14 DIAGNOSIS — I25.10 ATHEROSCLEROSIS OF NATIVE CORONARY ARTERY OF NATIVE HEART WITHOUT ANGINA PECTORIS: Primary | ICD-10-CM

## 2023-08-14 DIAGNOSIS — R00.1 BRADYCARDIA: ICD-10-CM

## 2023-08-14 DIAGNOSIS — I10 ESSENTIAL HYPERTENSION: ICD-10-CM

## 2023-08-14 DIAGNOSIS — I51.9 ASYMPTOMATIC LV DYSFUNCTION: ICD-10-CM

## 2023-08-14 LAB
QT INTERVAL: 454 MS
QTC INTERVAL: 426 MS

## 2023-08-14 PROCEDURE — 93000 ELECTROCARDIOGRAM COMPLETE: CPT | Performed by: INTERNAL MEDICINE

## 2023-08-14 PROCEDURE — 1159F MED LIST DOCD IN RCRD: CPT | Performed by: INTERNAL MEDICINE

## 2023-08-14 PROCEDURE — 3079F DIAST BP 80-89 MM HG: CPT | Performed by: INTERNAL MEDICINE

## 2023-08-14 PROCEDURE — 3077F SYST BP >= 140 MM HG: CPT | Performed by: INTERNAL MEDICINE

## 2023-08-14 PROCEDURE — 1160F RVW MEDS BY RX/DR IN RCRD: CPT | Performed by: INTERNAL MEDICINE

## 2023-08-14 PROCEDURE — 99214 OFFICE O/P EST MOD 30 MIN: CPT | Performed by: INTERNAL MEDICINE

## 2023-08-14 NOTE — PROGRESS NOTES
Storm Tellez  72 y.o. male    1. Atherosclerosis of native coronary artery of native heart without angina pectoris    2. Atrial flutter with rapid ventricular response    3. Paroxysmal atrial fibrillation    4. Bradycardia    5. Essential hypertension    6. Asymptomatic LV dysfunction        History of Present Illness:  Mr. Storm Tellez is a 72-year-old male who is status post coronary artery bypass surgery in 2005.  He had received LIMA to LAD, SVG to distal RCA, SVG to diagonal and free YOUNG to obtuse marginal coronary artery.      He was seen in office on 4/10/23 and noted to have markedly increased heart rate, which apparently has been present for at least 2 to 3 months according to him.  He has had pain in the hip joint, back pain which has restricted his mobility but on direct questioning he does indicate some degree of dyspnea on exertion.  No PND or orthopnea is reported.  He denies any chest pain.  He has had issues with his CPAP but this appears to have been resolved now.  EKG confirmed atrial flutter with rapid ventricular response.  Patient underwent SANJAY/cardioversion yesterday and converted to sinus rhythm.  Started on antiarrhythmic therapy with amiodarone.      SANJAY on 4/11/2023 showed:  Result: Overall LV systolic function was normal with an ejection fraction of 55 to 60%.  No wall motion abnormalities noted.  No LV mass or thrombus noted.  There is left ventricular hypertrophy which is concentric.  Right ventricle was borderline dilated.  Normal contractility was noted.  Left atrium was moderately dilated.  There was mild smoke noted.  Left atrial appendage was intact with no evidence of thrombus.  Right atrium was moderately dilated.  Interatrial septum was intact.  No PFO or ASD noted by bubble contrast studies.  Aortic root was normal in size.  Aortic valve is trileaflet.  No stenosis or regurgitation noted.  Mitral valve is structurally normal.  There was mild mitral  regurgitation.  Tricuspid valve is structurally normal there was trace tricuspid regurgitation.  No pericardial effusion noted.  Impression: Overall normal LV systolic function with an ejection fraction of 55 to 60% with no wall motion abnormalities.  Moderate biatrial enlargement.  No intracardiac mass or thrombus noted on the study.  No significant valve abnormalities noted.     Cardioversion on 4/11/2023 was performed.  Result: Synchronized biphasic cardioversion was performed using 200 J energy. Patient converted to sinus rhythm rhythm following 1 shock.  EKG postconversion showed sinus rhythm with PACs.     The patient has had episodes of slow heart rate of following discharge from the hospital with no definite recurrence of atrial flutter.  He has noted that at times his heart rate is down into the 30s.  There is occasional dizziness but no syncopal episodes.  His heart rate was 60 bpm and blood pressure 148/78 mmHg today.  He denied any chest pain or shortness of breath.    EKG today showed sinus rhythm with heart rate of 53 bpm.  VT interval 174 ms.  Rightward axis.  Poor R wave progression anteroseptal leads.  QTc interval 426 ms.    Holter monitor in June 2023 showed:  Result: Underlying rhythm was sinus with heart rate ranging from 38 bpm to 84 bpm with an average heart rate of 54 bpm.  There were 1035 PACs and 86 PVCs, both of which represented less than 1% of total beats.  The patient did have sinus bradycardia 88.5% of the time with heart rate down into the 30s and 40s especially during the night.  No patient triggered events were noted.  He did have 4 episodes of runs of supraventricular tachycardia, the longest being over 4 seconds run.     Impression: Event monitoring showing predominantly sinus rhythm with episodes of sinus bradycardia as described above.  Symptoms seem to occur mostly during the night.  No patient triggered events noted.     He has been evaluated by Dr. Mario in April 2023 and he  is on schedule to have EP study and possible ablation for atrial fibrillation/atrial flutter in October 2023.    The patient had recurrence of atrial fibrillation and his heart rate did go up to 120 bpm and was seen in the emergency room on 8/6/2023.  He was out of rhythm for more than 24 hours prior to this ER visit.  He did convert to sinus rhythm in the ER.  EKG there confirms sinus rhythm.  He has been unable to take any rate lowering medications or antiarrhythmic therapy secondary to episodes of heart rate in the 30s and 40s which has been noted on his Apple Watch and Holter monitor.  Troponins were negative for myocardial injury.    The patient had several questions with regards to his cardiac status, medication and these were explained to her once again in detail.  His case will once again be discussed with Dr. Mario to see if we can expedite the procedure.  He has been informed that he may need a pacemaker as well.  He is aware of pros and cons.    No Known Allergies      Past Medical History:   Diagnosis Date    Bradycardia     Cancer     skin    Coronary atherosclerosis of native coronary artery     Dizziness     Elevated cholesterol     Hypertension     Presence of aortocoronary bypass graft     Sleep apnea     SOB (shortness of breath)          Past Surgical History:   Procedure Laterality Date    CARDIAC CATHETERIZATION      COLONOSCOPY  05/23/2011    Per Rogelio Rogers M.D.    COLONOSCOPY N/A 8/1/2022    Procedure: COLONOSCOPY;  Surgeon: Otoniel Kenney MD;  Location: [facility] ENDOSCOPY;  Service: Gastroenterology;  Laterality: N/A;    CORONARY ARTERY BYPASS GRAFT      HERNIA REPAIR      SHOULDER SURGERY           Family History   Problem Relation Age of Onset    Heart disease Mother     Heart disease Father     Hypertension Other          Social History     Socioeconomic History    Marital status:    Tobacco Use    Smoking status: Former    Smokeless tobacco: Never    Tobacco comments:     " 06/23/2022 Patient states he utilized 0.5- 1 pack of Cigarettes per day x 10- 15 years 40 years prior.   Substance and Sexual Activity    Alcohol use: No    Drug use: No    Sexual activity: Defer         Current Outpatient Medications   Medication Sig Dispense Refill    apixaban (ELIQUIS) 5 MG tablet tablet Take 1 tablet by mouth Every 12 (Twelve) Hours. Indications: Atrial Fibrillation 180 tablet 3    atorvastatin (LIPITOR) 40 MG tablet Take 1 tablet by mouth Daily. 90 tablet 3    clopidogrel (PLAVIX) 75 MG tablet Take 1 tablet by mouth Daily. 90 tablet 3    folic acid (FOLVITE) 400 MCG tablet Take 1 tablet by mouth Daily.      isosorbide mononitrate (IMDUR) 30 MG 24 hr tablet Take 1 tablet by mouth Daily. 90 tablet 3    lisinopril-hydrochlorothiazide (PRINZIDE,ZESTORETIC) 20-12.5 MG per tablet Take 1 tablet by mouth Daily. (Patient taking differently: Take 1 tablet by mouth Daily. 1/2 am 1/2 qhs) 90 tablet 3    nitroglycerin (NITROSTAT) 0.4 MG SL tablet Place 1 tablet under the tongue Every 5 (Five) Minutes As Needed for Chest Pain. Take no more than 3 doses in 15 minutes. 30 tablet 6    ranolazine (RANEXA) 500 MG 12 hr tablet TAKE ONE TABLET BY MOUTH EVERY 12 HOURS 180 tablet 3     No current facility-administered medications for this visit.         OBJECTIVE    /80 (BP Location: Left arm, Patient Position: Sitting, Cuff Size: Adult)   Pulse 53   Temp 98.4 øF (36.9 øC)   Ht 175.3 cm (69\")   Wt 99 kg (218 lb 4.8 oz)   SpO2 98%   BMI 32.24 kg/mý         Review of Systems : The following systems were reviewed and changes noted as indicated under history of present illness and below    Constitutional:  Denies recent weight loss, weight gain, fever or chills, no change in exercise tolerance     HENT:  Denies any hearing loss, epistaxis, hoarseness, or difficulty speaking.     Eyes: Wears eyeglasses or contact lenses     Respiratory: History of sleep apnea.  No chest pain or shortness of " breath    Cardiovascular: See HPI.  He has noted heart rates in the 30s and 40s.  History of paroxysmal atrial fibrillation.  No chest pain or dyspnea.  No syncope.    Gastrointestinal:  Denies change in bowel habits, dyspepsia, ulcer disease, hematochezia, or melena.     Endocrine: Negative for cold intolerance, heat intolerance, polydipsia, polyphagia and polyuria.     Genitourinary: Negative.      Musculoskeletal: DJD.      Neurological:  Denies any history of recurrent headaches, strokes, TIA, or seizure disorder.     Hematological: Denies any food allergies, seasonal allergies, bleeding disorders, or lymphadenopathy.     Psychiatric/Behavioral: Denies any history of depression, substance abuse, or change in cognitive function.         Physical Exam : The following systems were reassessed and no changes noted    Constitutional: Cooperative, alert and oriented,  in no acute distress.     HENT:   Head: Normocephalic, normal hair patterns, no masses or tenderness.  Ears, Nose, and Throat: No gross abnormalities. No pallor or cyanosis.   Eyes: EOMS intact, PERRL, conjunctivae and lids unremarkable. Fundoscopic exam and visual fields not performed.   Neck: No palpable masses or adenopathy, no thyromegaly, no JVD, carotid pulses are full and equal bilaterally and without  Bruits.     Cardiovascular: Normal rhythm, S1 variable, S2 normal, no S3 or S4.  No murmurs, gallops, or rubs detected.     Pulmonary/Chest: Chest: normal symmetry, no tenderness to palpation, normal respiratory excursion, no intercostal retraction, no use of accessory muscles.            Pulmonary: Normal breath sounds. No rales or ronchi.    Abdominal: Abdomen soft, bowel sounds normoactive, no masses, no hepatosplenomegaly, non-tender, no bruits.     Musculoskeletal: No deformities, clubbing, cyanosis, erythema, or edema observed.     Neurological: No gross motor or sensory deficits noted, affect appropriate, oriented to time, person, place.      Skin: Warm and dry to the touch, no apparent skin lesions or masses noted.     Psychiatric: He has a normal mood and affect. His behavior is normal. Judgment and thought content normal.         Procedures      Lab Results   Component Value Date    WBC 6.60 08/06/2023    HGB 15.1 08/06/2023    HCT 43.3 08/06/2023    MCV 94.3 08/06/2023     08/06/2023     Lab Results   Component Value Date    GLUCOSE 157 (H) 08/06/2023    BUN 16 08/06/2023    CREATININE 1.14 08/06/2023    EGFRIFNONA 90 08/22/2017    BCR 14.0 08/06/2023    CO2 27.0 08/06/2023    CALCIUM 9.3 08/06/2023    ALBUMIN 3.7 08/06/2023    AST 11 08/06/2023    ALT 8 08/06/2023     No results found for: CHOL  No results found for: TRIG  No results found for: HDL  No components found for: LDLCALC  No results found for: LDL  No results found for: HDLLDLRATIO  No components found for: CHOLHDL  No results found for: HGBA1C  Lab Results   Component Value Date    TSH 0.994 04/10/2023           ASSESSMENT AND PLAN  Mr. Tellez has multiple medical issues including coronary artery disease status post CABG, hypertension, hyperlipidemia, sleep apnea on BiPAP, who has presented with palpitations, dyspnea on exertion and noted to have atrial flutter with rapid ventricular response.     As discussed in detail in the history of present illness, I will discuss his case with Dr. Mario and see if we can expedite the procedure.  He is having episodes of paroxysmal atrial fibrillation and is known to have a slow heart rate from time to time.  Fortunately he is not very symptomatic with that.    I have prescribed diltiazem 30 mg tablets to be taken on a as needed basis if his heart rate is greater than 100 bpm and he has been advised to take no more than 3 tablets a day.  He is aware of the pros and cons.  I have continued anticoagulation with Eliquis, antihypertensive therapy with  lisinopril HCTZ, antianginal therapy with isosorbide mononitrate and Ranexa and antiplatelet  therapy with Plavix.      Diagnoses and all orders for this visit:    1. Atherosclerosis of native coronary artery of native heart without angina pectoris (Primary)  -     ECG 12 Lead    2. Atrial flutter with rapid ventricular response    3. Paroxysmal atrial fibrillation    4. Bradycardia    5. Essential hypertension    6. Asymptomatic LV dysfunction        Patient's Body mass index is 32.24 kg/mý. BMI is above normal parameters. Recommendations include: exercise counseling and nutrition counseling.  Patient is a non-smoker    Pradeep Francis MD  8/14/2023  13:28 CDT

## (undated) DEVICE — CANN SMPL SOFTECH BIFLO ETCO2 A/M 7FT